# Patient Record
Sex: FEMALE | Race: WHITE | Employment: FULL TIME | URBAN - METROPOLITAN AREA
[De-identification: names, ages, dates, MRNs, and addresses within clinical notes are randomized per-mention and may not be internally consistent; named-entity substitution may affect disease eponyms.]

---

## 2018-07-16 ENCOUNTER — OFFICE VISIT (OUTPATIENT)
Dept: URGENT CARE | Facility: URGENT CARE | Age: 18
End: 2018-07-16
Payer: MEDICAID

## 2018-07-16 VITALS
OXYGEN SATURATION: 100 % | WEIGHT: 111 LBS | SYSTOLIC BLOOD PRESSURE: 124 MMHG | DIASTOLIC BLOOD PRESSURE: 76 MMHG | RESPIRATION RATE: 12 BRPM | HEART RATE: 108 BPM | TEMPERATURE: 98.1 F

## 2018-07-16 DIAGNOSIS — R11.0 NAUSEA: Primary | ICD-10-CM

## 2018-07-16 LAB
ALBUMIN UR-MCNC: NEGATIVE MG/DL
APPEARANCE UR: CLEAR
BETA HCG QUAL IFA URINE: NEGATIVE
BILIRUB UR QL STRIP: NEGATIVE
COLOR UR AUTO: YELLOW
GLUCOSE UR STRIP-MCNC: NEGATIVE MG/DL
HGB UR QL STRIP: NEGATIVE
KETONES UR STRIP-MCNC: NEGATIVE MG/DL
LEUKOCYTE ESTERASE UR QL STRIP: NEGATIVE
NITRATE UR QL: NEGATIVE
NON-SQ EPI CELLS #/AREA URNS LPF: NORMAL /LPF
PH UR STRIP: 6.5 PH (ref 5–7)
RBC #/AREA URNS AUTO: NORMAL /HPF
SOURCE: NORMAL
SP GR UR STRIP: 1.01 (ref 1–1.03)
UROBILINOGEN UR STRIP-ACNC: 0.2 EU/DL (ref 0.2–1)
WBC #/AREA URNS AUTO: NORMAL /HPF

## 2018-07-16 PROCEDURE — 84703 CHORIONIC GONADOTROPIN ASSAY: CPT | Performed by: PHYSICIAN ASSISTANT

## 2018-07-16 PROCEDURE — 99214 OFFICE O/P EST MOD 30 MIN: CPT | Performed by: PHYSICIAN ASSISTANT

## 2018-07-16 PROCEDURE — 81001 URINALYSIS AUTO W/SCOPE: CPT | Performed by: PHYSICIAN ASSISTANT

## 2018-07-16 RX ORDER — ONDANSETRON 4 MG/1
4 TABLET, FILM COATED ORAL EVERY 8 HOURS PRN
Qty: 18 TABLET | Refills: 0 | Status: SHIPPED | OUTPATIENT
Start: 2018-07-16 | End: 2018-07-27

## 2018-07-16 NOTE — PROGRESS NOTES
CHIEF COMPLAINT:   Chief Complaint   Patient presents with     Urgent Care     Abdominal Pain     Pt has had soem stomach issues for abut 3 days.  It gets better and worse, off and on.  States that she was struggling to have BM, but then she did have one last night.  Occasional nausea and cramping as well.  No fever.  She has also felt light headed at times ove rthe last 2 days.  She has also been experiencing some anxiety, no specific reason.  Does not take anything for the anxiety.       HPI: Milana Santos is a 17 year old female who presents to clinic today for evaluation of stomach issues for the last 3 days.  Patient notes that she has had stomach aches for about 1 month and have been off and on.  She feels some nausea associated with these although no fever or vomiting.  Endorses diarrhea for the last 3 days.  During this time she has also felt lightheaded and a fast heart rate.  She states that she has been feeling nervous and that she has had difficulty sleeping.  She is unsure what is causing her to feel nervous, but notes that every time she goes to a new place and comes back home she feels nervous.  She has been using oils, and doing yoga to help with her nerves.  Recent weight loss 14 pounds in the last 6 weeks. Both her mother and great grandmother have history of thyroid disease.    No past medical history on file.  No past surgical history on file.  Social History   Substance Use Topics     Smoking status: Never Smoker     Smokeless tobacco: Never Used     Alcohol use Not on file     Current Outpatient Prescriptions   Medication     escitalopram (LEXAPRO) 5 MG tablet     ondansetron (ZOFRAN) 4 MG tablet     No current facility-administered medications for this visit.      No Known Allergies    10 point ROS of systems including Constitutional, Eyes, Respiratory, Cardiovascular, Gastroenterology, Genitourinary, Integumentary, Muscularskeletal, Psychiatric were all negative except for pertinent  positives noted in my HPI.        Exam:  /76  Pulse 108  Temp 98.1  F (36.7  C) (Oral)  Resp 12  Wt 111 lb (50.3 kg)  SpO2 100%  Constitutional: healthy, alert and no distress  Head: Normocephalic, atraumatic.  Eyes: conjunctiva clear, no drainage  ENT: TMs clear and shiny hermes, nasal mucosa pink and moist, throat without tonsillar hypertrophy or erythema  Neck: neck is supple, no cervical lymphadenopathy or nuchal rigidity  Cardiovascular: RRR  Respiratory: CTA bilaterally, no rhonchi or rales  Gastrointestinal: soft and nontender  Skin: no rashes  Neurologic: Speech clear, gait normal. Moves all extremities.    Results for orders placed or performed in visit on 07/16/18   UA with Microscopic reflex to Culture   Result Value Ref Range    Color Urine Yellow     Appearance Urine Clear     Glucose Urine Negative NEG^Negative mg/dL    Bilirubin Urine Negative NEG^Negative    Ketones Urine Negative NEG^Negative mg/dL    Specific Gravity Urine 1.010 1.003 - 1.035    pH Urine 6.5 5.0 - 7.0 pH    Protein Albumin Urine Negative NEG^Negative mg/dL    Urobilinogen Urine 0.2 0.2 - 1.0 EU/dL    Nitrite Urine Negative NEG^Negative    Blood Urine Negative NEG^Negative    Leukocyte Esterase Urine Negative NEG^Negative    Source Midstream Urine     WBC Urine 0 - 5 OTO5^0 - 5 /HPF    RBC Urine O - 2 OTO2^O - 2 /HPF    Squamous Epithelial /LPF Urine Few FEW^Few /LPF   Beta HCG qual IFA urine   Result Value Ref Range    Beta HCG Qual IFA Urine Negative NEG^Negative            ASSESSMENT/PLAN:  1. Nausea  17 year old female presents with a one-month history of nausea, anxiety, diarrhea and weight loss.  Labs done today are reassuring as well as the labs done at her primary care office.  Long discussion had with patient about stress reduction, belly breathing and anxiety reducing techniques.  Patient is not interested in beginning an SSRI today, secondary to side effects.  Appointment is made with a PCP for the end of this  week to discuss symptoms and so that she can think about anxiolytic medication more and discussed it with her mother.  Patient is nonsuicidal and safe to discharge home.  - UA with Microscopic reflex to Culture  - Beta HCG qual IFA urine  - ondansetron (ZOFRAN) 4 MG tablet; Take 1 tablet (4 mg) by mouth every 8 hours as needed for nausea  Dispense: 18 tablet; Refill: 0    25 minutes was spent with the patient and greater than 50% was spent counseling.  Sanam Duff PA-C

## 2018-07-16 NOTE — MR AVS SNAPSHOT
After Visit Summary   7/16/2018    Milana Santos    MRN: [id number]           Patient Information     Date Of Birth          2000        Visit Information        Provider Department      7/16/2018 3:10 PM Sanam Duff PA-C [facility]        Today's Diagnoses     Nausea    -  1      Care Instructions    It seems that your symptoms are all related to your anxiety. I sent over a prescription for Zofran which should help with nausea. In the meantime, small meals frequently. Would like you to speak with Dr. Max, if you are still feeling nauseas or like you cannot eat. Urine results are normal here.           Follow-ups after your visit        Your next 10 appointments already scheduled     Jul 20, 2018  1:50 PM CDT   [facility]    [address] [place] [address]  [address]  [facility] [id number] [phone]           Bring a current list of meds and any records pertaining to this visit. For Physicals, please bring immunization records and any forms needing to be filled out. Please arrive 10 minutes early to complete paperwork.              Who to contact     If you have questions or need follow up information about today's clinic visit or your schedule please contact [facility] directly at [phone].  Normal or non-critical lab and imaging results will be communicated to you by MyChart, letter or phone within 4 business days after the clinic has received the results. If you do not hear from us within 7 days, please contact the clinic through MyChart or phone. If you have a critical or abnormal lab result, we will notify you by phone as soon as possible.  Submit refill requests through [employer] or call your pharmacy and they will forward the refill request to us. Please allow 3 business days for your refill to be completed.          Additional Information About Your Visit         Care EveryWhere ID     This is your Care EveryWhere ID. This could be used by other organizations to access your [facility] medical records  [id number]        Your Vitals Were     Pulse Temperature Respirations Pulse Oximetry          108 98.1  F (36.7  C) (Oral) 12 100%         Blood Pressure from Last 3 Encounters:   07/16/18 124/76   04/15/15 124/83    Weight from Last 3 Encounters:   07/16/18 111 lb (50.3 kg) (17 %)*   04/15/15 112 lb 7 oz (51 kg) (32 %)*   12/29/13 106 lb 4 oz (48.2 kg) (29 %)*     * Growth percentiles are based on [facility] 2-20 Years data.              We Performed the Following     Beta HCG qual IFA urine     UA with Microscopic reflex to Culture          Today's Medication Changes          These changes are accurate as of 7/16/18  4:44 PM.  If you have any questions, ask your nurse or doctor.               Start taking these medicines.        Dose/Directions    ondansetron 4 MG tablet   Commonly known as:  ZOFRAN   Used for:  Nausea        Dose:  4 mg   Take 1 tablet (4 mg) by mouth every 8 hours as needed for nausea   Quantity:  18 tablet   Refills:  0            Where to get your medicines      These medications were sent to [facility] [place], [place] - [address]   [address], [place] [address]     Phone:  [phone]     ondansetron 4 MG tablet                Primary Care Provider Office Phone # Fax #    Tamara Vieira, [facility] [phone]       [facility] [address] [place] [address]        Equal Access to Services     [facility]: Hadii aad ku hadasho Soomaali, waaxda luqadaha, qaybta kaalmada adeegyaemmanuel, waxprieto idiin hayaan adeeg kharash la'aan . So [facility] [phone].    ATENCIÓN: Si habla español, tiene a holder disposición servicios gratuitos de asistencia lingüística. Llame al [phone].    We comply with applicable federal civil rights laws and [place] laws. We do not discriminate on the basis of  race, color, national origin, age, disability, sex, sexual orientation, or gender identity.            Thank you!     Thank you for choosing [facility]  for your care. Our goal is always to provide you with excellent care. Hearing back from our patients is one way we can continue to improve our services. Please take a few minutes to complete the written survey that you may receive in the mail after your visit with us. Thank you!             Your Updated Medication List - Protect others around you: Learn how to safely use, store and throw away your medicines at www.disposemymeds.org.          This list is accurate as of 7/16/18  4:44 PM.  Always use your most recent med list.                   Brand Name Dispense Instructions for use Diagnosis    ondansetron 4 MG tablet    ZOFRAN    18 tablet    Take 1 tablet (4 mg) by mouth every 8 hours as needed for nausea    Nausea

## 2018-07-16 NOTE — PATIENT INSTRUCTIONS
It seems that your symptoms are all related to your anxiety. I sent over a prescription for Zofran which should help with nausea. In the meantime, small meals frequently. Would like you to speak with Dr. Max, if you are still feeling nauseas or like you cannot eat. Urine results are normal here.

## 2018-07-16 NOTE — NURSING NOTE
Chief Complaint   Patient presents with     Urgent Care     Abdominal Pain     Pt has had soem stomach issues for abut 3 days.  It gets better and worse, off and on.  States that she was struggling to have BM, but then she did have one last night.  Occasional nausea and cramping as well.  No fever.  She has also felt light headed at times ove rthe last 2 days.  She has also been experiencing some anxiety, no specific reason.  Does not take anything for the anxiety.     Initial /76  Pulse 108  Temp 98.1  F (36.7  C) (Oral)  Resp 12  Wt 111 lb (50.3 kg)  SpO2 100% There is no height or weight on file to calculate BMI..  BP completed using cuff size: regular  Mary Carmen Ayala R.N.

## 2018-07-18 ENCOUNTER — OFFICE VISIT (OUTPATIENT)
Dept: PEDIATRICS | Facility: CLINIC | Age: 18
End: 2018-07-18
Payer: MEDICAID

## 2018-07-18 VITALS
HEIGHT: 63 IN | OXYGEN SATURATION: 99 % | SYSTOLIC BLOOD PRESSURE: 110 MMHG | TEMPERATURE: 98.2 F | BODY MASS INDEX: 19.67 KG/M2 | WEIGHT: 111 LBS | HEART RATE: 67 BPM | DIASTOLIC BLOOD PRESSURE: 60 MMHG

## 2018-07-18 DIAGNOSIS — F41.9 ANXIETY: ICD-10-CM

## 2018-07-18 DIAGNOSIS — R19.7 DIARRHEA, UNSPECIFIED TYPE: Primary | ICD-10-CM

## 2018-07-18 PROBLEM — H91.93 BILATERAL DEAFNESS: Status: ACTIVE | Noted: 2018-07-18

## 2018-07-18 LAB
ALBUMIN SERPL-MCNC: 4.1 G/DL (ref 3.4–5)
ALP SERPL-CCNC: 60 U/L (ref 40–150)
ALT SERPL W P-5'-P-CCNC: 21 U/L (ref 0–50)
ANION GAP SERPL CALCULATED.3IONS-SCNC: 10 MMOL/L (ref 3–14)
AST SERPL W P-5'-P-CCNC: 24 U/L (ref 0–35)
BILIRUB SERPL-MCNC: 0.7 MG/DL (ref 0.2–1.3)
BUN SERPL-MCNC: 5 MG/DL (ref 7–30)
CALCIUM SERPL-MCNC: 9.3 MG/DL (ref 8.5–10.1)
CHLORIDE SERPL-SCNC: 107 MMOL/L (ref 96–110)
CO2 SERPL-SCNC: 23 MMOL/L (ref 20–32)
CREAT SERPL-MCNC: 0.8 MG/DL (ref 0.5–1)
CRP SERPL-MCNC: <2.9 MG/L (ref 0–8)
ERYTHROCYTE [SEDIMENTATION RATE] IN BLOOD BY WESTERGREN METHOD: 5 MM/H (ref 0–20)
GFR SERPL CREATININE-BSD FRML MDRD: >90 ML/MIN/1.7M2
GLUCOSE SERPL-MCNC: 106 MG/DL (ref 70–99)
POTASSIUM SERPL-SCNC: 4.2 MMOL/L (ref 3.4–5.3)
PROT SERPL-MCNC: 7.7 G/DL (ref 6.8–8.8)
SODIUM SERPL-SCNC: 140 MMOL/L (ref 133–144)

## 2018-07-18 PROCEDURE — 99215 OFFICE O/P EST HI 40 MIN: CPT | Performed by: PHYSICIAN ASSISTANT

## 2018-07-18 PROCEDURE — 86140 C-REACTIVE PROTEIN: CPT | Performed by: PHYSICIAN ASSISTANT

## 2018-07-18 PROCEDURE — 80053 COMPREHEN METABOLIC PANEL: CPT | Performed by: PHYSICIAN ASSISTANT

## 2018-07-18 PROCEDURE — 36415 COLL VENOUS BLD VENIPUNCTURE: CPT | Performed by: PHYSICIAN ASSISTANT

## 2018-07-18 PROCEDURE — 85652 RBC SED RATE AUTOMATED: CPT | Performed by: PHYSICIAN ASSISTANT

## 2018-07-18 RX ORDER — ESCITALOPRAM OXALATE 5 MG/1
TABLET ORAL
Qty: 60 TABLET | Refills: 1 | Status: SHIPPED | OUTPATIENT
Start: 2018-07-18 | End: 2018-07-27 | Stop reason: SINTOL

## 2018-07-18 RX ORDER — ESCITALOPRAM OXALATE 5 MG/1
TABLET ORAL
Qty: 60 TABLET | Refills: 0 | Status: SHIPPED | OUTPATIENT
Start: 2018-07-18 | End: 2018-07-18

## 2018-07-18 ASSESSMENT — ANXIETY QUESTIONNAIRES
2. NOT BEING ABLE TO STOP OR CONTROL WORRYING: MORE THAN HALF THE DAYS
5. BEING SO RESTLESS THAT IT IS HARD TO SIT STILL: MORE THAN HALF THE DAYS
6. BECOMING EASILY ANNOYED OR IRRITABLE: MORE THAN HALF THE DAYS
1. FEELING NERVOUS, ANXIOUS, OR ON EDGE: NEARLY EVERY DAY
7. FEELING AFRAID AS IF SOMETHING AWFUL MIGHT HAPPEN: NEARLY EVERY DAY
GAD7 TOTAL SCORE: 16
3. WORRYING TOO MUCH ABOUT DIFFERENT THINGS: MORE THAN HALF THE DAYS
IF YOU CHECKED OFF ANY PROBLEMS ON THIS QUESTIONNAIRE, HOW DIFFICULT HAVE THESE PROBLEMS MADE IT FOR YOU TO DO YOUR WORK, TAKE CARE OF THINGS AT HOME, OR GET ALONG WITH OTHER PEOPLE: VERY DIFFICULT

## 2018-07-18 ASSESSMENT — PATIENT HEALTH QUESTIONNAIRE - PHQ9: 5. POOR APPETITE OR OVEREATING: MORE THAN HALF THE DAYS

## 2018-07-18 NOTE — MR AVS SNAPSHOT
"              After Visit Summary   7/18/2018    Milana Santos    MRN: [id number]           Patient Information     Date Of Birth          2000        Visit Information        Provider Department      7/18/2018 11:30 AM Argenis Marshall PA-C; [facility]        Today's Diagnoses     Diarrhea, unspecified type    -  1    Anxiety          Care Instructions    8/2 THU 11:10 am  Dr. Cornell               Generalized Anxiety Disorder  What is generalized anxiety disorder?   Generalized anxiety disorder (PELON) is a condition in which a person worries excessively and unrealistically. They may also be jittery, restless, or dizzy. When these symptoms last for at least 6 months, a diagnosis of PELON may be made.  PELON may exist by itself, or with both anxiety and depression. It is estimated that almost 5% of people have had this disorder during their lives.  How does it occur?   The cause of PELON is unknown. Genetic and environmental factors play a role. Women have PELON about twice as often as men.  The worry in PELON is not about panic attacks or being afraid in public places. It is typically \"free-floating\" anxiety out of proportion to any real life situation. The worrying can interfere with normal day-to-day activities and work or school.  What are the symptoms?   Symptoms include excessive, unrealistic, and uncontrollable worrying about many things such as:  the state of the world   the economy   violence in society   your job   the bills   chores   family members  Physical symptoms such as muscle tension, sleep problems, or feeling on edge usually go along with anxiety. A person may be short-tempered and unable to focus or concentrate because of the worrying. Other symptoms include sweating, shaking, having a very fast heartbeat, feeling out of breath, needing to go to the bathroom often and feeling like fainting. People with PELON may be uneasy in a group or in a waiting room.  How is it " diagnosed?   There is no lab test for PELON. Your healthcare provider or therapist will ask about your symptoms. He or she will make sure you do not have a medical illness or drug or alcohol problem that could cause the symptoms. Some medicines can cause anxiety or make it worse. These include asthma medicines, stimulants, and steroids such as prednisone.  If you have had the symptoms for at least 6 months, if you have had to cut back on your activities, and if you find it difficult to get things done, you may be diagnosed with generalized anxiety disorder.  How is it treated?   Different types of approaches have proven helpful in treating PELON. These include medicine, behavior therapy, relaxation therapy, cognitive therapy, and stress management techniques. Which treatments your healthcare provider or therapist uses may depend upon how much the disorder interferes with your day-to-day life.  Several types of medicines can help treat PELON. Your healthcare provider will work with you to carefully select the best one for you.  How long will the effects last?   PELON can last many years and sometimes an entire lifetime.   How can I take care of myself?   Get support. Talk with family and friends. Consider joining a support group in your area. Go to a stress management class in your local community.   Learn to manage stress. Ask for help at home and work when the load is too great to handle. Find ways to relax, for example take up a hobby, listen to music, watch movies, take walks. Try deep breathing exercises when you feel stressed.   Take care of your physical health. Try to get at least 7 to 9 hours of sleep each night. Eat a healthy diet. Limit caffeine. If you smoke, quit. Avoid alcohol and drugs, because they can make your symptoms worse. Exercise according to your healthcare provider's instructions.   Check your medicines. To help prevent problems, tell your healthcare provider and pharmacist about all the medicines,  natural remedies, vitamins, and other supplements that you take.   Contact your healthcare provider or therapist if you have any questions or your symptoms seem to be getting worse.  You may also want to contact [facility] (formerly the National Mental Health Association or [facility]). [facility]'s toll-free Information Center number is 6-329-766-[facility]. Its web site address is [email or web address]              Follow-ups after your visit        Your next 10 appointments already scheduled     Jul 20, 2018  1:50 PM CDT   Office Visit with Rocco Max MD   [facility] [place] ([facility])    [address]  [address]  [facility] [id number] [phone]           Bring a current list of meds and any records pertaining to this visit. For Physicals, please bring immunization records and any forms needing to be filled out. Please arrive 10 minutes early to complete paperwork.              Future tests that were ordered for you today     Open Future Orders        Priority Expected Expires Ordered    Cryptosporidium in stool stain Routine  7/18/2019 7/18/2018    Giardia antigen Routine  7/18/2019 7/18/2018    Ova and Parasite Exam Routine Routine  7/18/2019 7/18/2018    Enteric Bacteria and Virus Panel by HILARIA Stool Routine  7/18/2019 7/18/2018            Who to contact     If you have questions or need follow up information about today's clinic visit or your schedule please contact [facility] directly at [phone].  Normal or non-critical lab and imaging results will be communicated to you by MyChart, letter or phone within 4 business days after the clinic has received the results. If you do not hear from us within 7 days, please contact the clinic through MyChart or phone. If you have a critical or abnormal lab result, we will notify you by phone as soon as possible.  Submit refill requests through [employer] or call your pharmacy and they will forward the refill request to  "us. Please allow 3 business days for your refill to be completed.          Additional Information About Your Visit        Care EveryWhere ID     This is your Care EveryWhere ID. This could be used by other organizations to access your [facility] medical records  [id number]        Your Vitals Were     Pulse Temperature Height Pulse Oximetry BMI (Body Mass Index)       67 98.2  F (36.8  C) (Oral) 5' 3\" (1.6 m) 99% 19.66 kg/m2        Blood Pressure from Last 3 Encounters:   07/18/18 110/60   07/16/18 124/76   04/15/15 124/83    Weight from Last 3 Encounters:   07/18/18 111 lb (50.3 kg) (17 %)*   07/16/18 111 lb (50.3 kg) (17 %)*   04/15/15 112 lb 7 oz (51 kg) (32 %)*     * Growth percentiles are based on [facility] 2-20 Years data.              We Performed the Following     Comprehensive metabolic panel     CRP inflammation     Erythrocyte sedimentation rate auto          Today's Medication Changes          These changes are accurate as of 7/18/18 12:26 PM.  If you have any questions, ask your nurse or doctor.               Start taking these medicines.        Dose/Directions    escitalopram 5 MG tablet   Commonly known as:  LEXAPRO   Used for:  Anxiety   Started by:  Argenis Marshall PA-C        Take 1 tab daily x one week, then increase to two tabs daily   Quantity:  60 tablet   Refills:  0            Where to get your medicines      These medications were sent to [id number]  [place], [place] - [address]  [address] [place] [address]     Phone:  [phone]     escitalopram 5 MG tablet                Primary Care Provider Office Phone # Fax #    Tamara Iesha Vieira, [place] [phone]       [facility] [address] [place] [address]        Equal Access to Services     [facility] CHRISTOPHER AH: Hadii larisa crafto Sogina, waaxda luqadaha, qaybta kaalmada adeegyada, anton courtney. So [facility] [phone].    ATENCIÓN: Si derrick espfelix rachel a holder " disposición servicios gratuitos de asistencia lingüística. Aicha camejo [phone].    We comply with applicable federal civil rights laws and [place] laws. We do not discriminate on the basis of race, color, national origin, age, disability, sex, sexual orientation, or gender identity.            Thank you!     Thank you for choosing [facility]  for your care. Our goal is always to provide you with excellent care. Hearing back from our patients is one way we can continue to improve our services. Please take a few minutes to complete the written survey that you may receive in the mail after your visit with us. Thank you!             Your Updated Medication List - Protect others around you: Learn how to safely use, store and throw away your medicines at www.disposemymeds.org.          This list is accurate as of 7/18/18 12:26 PM.  Always use your most recent med list.                   Brand Name Dispense Instructions for use Diagnosis    escitalopram 5 MG tablet    LEXAPRO    60 tablet    Take 1 tab daily x one week, then increase to two tabs daily    Anxiety       ondansetron 4 MG tablet    ZOFRAN    18 tablet    Take 1 tablet (4 mg) by mouth every 8 hours as needed for nausea    Nausea

## 2018-07-18 NOTE — PROGRESS NOTES
"  SUBJECTIVE:   Milana Santos is a 17 year old female, accompanied by mother, who presents to clinic today for the following health issues:  Patient and mother are deaf.    is present for OV.     Abnormal Mood Symptoms  Onset: 4 months and worsening    Description:   Depression: no  Anxiety: YES    Accompanying Signs & Symptoms:  Still participating in activities that you used to enjoy: no  Fatigue: YES  Irritability: no  Difficulty concentrating: YES  Changes in appetite: YES- just this week  Problems with sleep: YES- falling asleep   Mind racing  Heart racing/beating fast : YES- sometimes  Thoughts of hurting yourself or others: none    History:   Recent stress: no  Prior depression hospitalization: None  Family history of depression: YES  Family history of anxiety: YES     Precipitating factors:   Alcohol/drug use: no    Alleviating factors:  Yoga  Therapies Tried and outcome: None  No interest in counseling.     Patient enrolled in school in [place]. Returned from [facility] for vacation.     Patient complains of nausea --most of time. Diarrhea x 3 days and now back to normal. Constipation sometimes. No blood in stools. No vomiting, fevers, chills. Feels subjectively hot and low energy. No heartburn.  RLQ abdominal pain x one week. Intermittent. Symptoms worse with foods. Unsure of types of food.   No history of abdominal surgeries. No history of appendicitis, kidney stones. No history of ovarian cysts. No history of vaginal bleeding. No  symptoms.   Patient currently has menses.  Discussed with prior doctor and labs completed. TSH, Vitamin D both wnl. CBC reveals mild anemia. Ferritin wnl.    ROS:  ROS otherwise negative    OBJECTIVE:                                                    /60 (BP Location: Right arm, Cuff Size: Adult Regular)  Pulse 67  Temp 98.2  F (36.8  C) (Oral)  Ht 5' 3\" (1.6 m)  Wt 111 lb (50.3 kg)  SpO2 99%  BMI 19.66 kg/m2  Body mass index is 19.66 " kg/(m^2).   GENERAL: alert, no distress  HENT: ear canals- normal; TMs- normal; Nose- normal; Mouth- no ulcers, no lesions  NECK: no tenderness, no adenopathy  RESP: lungs clear to auscultation - no rales, no rhonchi, no wheezes  CV: regular rates and rhythm, normal S1 S2, no S3 or S4 and no murmur, no click or rub  ABDOMEN: soft, mid abdominal tenderness, no  hepatosplenomegaly, no masses, normal bowel sounds  BACK: no CVAT  Psych:  mentation appears normal, tearful    Diagnostic test results:  PELON 16  Results for orders placed or performed in visit on 07/18/18 (from the past 24 hour(s))   Erythrocyte sedimentation rate auto   Result Value Ref Range    Sed Rate 5 0 - 20 mm/h        ASSESSMENT/PLAN:                                                    (R19.7) Diarrhea, unspecified type  (primary encounter diagnosis)  Comment: given duration and accompanying symptoms, proceed with stool cultures and labs.   Plan: Erythrocyte sedimentation rate auto, CRP         inflammation, Comprehensive metabolic panel,         Cryptosporidium in stool stain, Giardia         antigen, Ova and Parasite Exam Routine, Enteric        Bacteria and Virus Panel by HILARIA Stool            (F41.9) Anxiety  Comment: discussed in detail today. Reviewed medications, SE, therapeutic levels. Patient reluctant but with increasing anxiety, tearfulness and family history, I do believe patient will benefit from SSRI. Close follow up in two weeks; sooner as needed.   Plan: escitalopram (LEXAPRO) 5 MG tablet,         DISCONTINUED: escitalopram (LEXAPRO) 5 MG         tablet          Time spent in room for the above concerns was 55 minutes.     Argenis Marshall PA-C  [facility]

## 2018-07-18 NOTE — PATIENT INSTRUCTIONS
"8/2 THU 11:10 am  Dr. Cornell               Generalized Anxiety Disorder  What is generalized anxiety disorder?   Generalized anxiety disorder (PELON) is a condition in which a person worries excessively and unrealistically. They may also be jittery, restless, or dizzy. When these symptoms last for at least 6 months, a diagnosis of PELON may be made.  PELON may exist by itself, or with both anxiety and depression. It is estimated that almost 5% of people have had this disorder during their lives.  How does it occur?   The cause of PELON is unknown. Genetic and environmental factors play a role. Women have PELON about twice as often as men.  The worry in PELON is not about panic attacks or being afraid in public places. It is typically \"free-floating\" anxiety out of proportion to any real life situation. The worrying can interfere with normal day-to-day activities and work or school.  What are the symptoms?   Symptoms include excessive, unrealistic, and uncontrollable worrying about many things such as:  the state of the world   the economy   violence in society   your job   the bills   chores   family members  Physical symptoms such as muscle tension, sleep problems, or feeling on edge usually go along with anxiety. A person may be short-tempered and unable to focus or concentrate because of the worrying. Other symptoms include sweating, shaking, having a very fast heartbeat, feeling out of breath, needing to go to the bathroom often and feeling like fainting. People with PELON may be uneasy in a group or in a waiting room.  How is it diagnosed?   There is no lab test for PELON. Your healthcare provider or therapist will ask about your symptoms. He or she will make sure you do not have a medical illness or drug or alcohol problem that could cause the symptoms. Some medicines can cause anxiety or make it worse. These include asthma medicines, stimulants, and steroids such as prednisone.  If you have had the symptoms for at least 6 " months, if you have had to cut back on your activities, and if you find it difficult to get things done, you may be diagnosed with generalized anxiety disorder.  How is it treated?   Different types of approaches have proven helpful in treating PELON. These include medicine, behavior therapy, relaxation therapy, cognitive therapy, and stress management techniques. Which treatments your healthcare provider or therapist uses may depend upon how much the disorder interferes with your day-to-day life.  Several types of medicines can help treat PELON. Your healthcare provider will work with you to carefully select the best one for you.  How long will the effects last?   PELON can last many years and sometimes an entire lifetime.   How can I take care of myself?   Get support. Talk with family and friends. Consider joining a support group in your area. Go to a stress management class in your local community.   Learn to manage stress. Ask for help at home and work when the load is too great to handle. Find ways to relax, for example take up a hobby, listen to music, watch movies, take walks. Try deep breathing exercises when you feel stressed.   Take care of your physical health. Try to get at least 7 to 9 hours of sleep each night. Eat a healthy diet. Limit caffeine. If you smoke, quit. Avoid alcohol and drugs, because they can make your symptoms worse. Exercise according to your healthcare provider's instructions.   Check your medicines. To help prevent problems, tell your healthcare provider and pharmacist about all the medicines, natural remedies, vitamins, and other supplements that you take.   Contact your healthcare provider or therapist if you have any questions or your symptoms seem to be getting worse.  You may also want to contact [employer] (formerly the National Mental [employer] or [employer]). [employer]'s toll-free Information Center number is 7-451-780-[facility]. Its web site address is  http://www.NMHA.org

## 2018-07-19 DIAGNOSIS — R19.7 DIARRHEA, UNSPECIFIED TYPE: ICD-10-CM

## 2018-07-19 PROCEDURE — 87209 SMEAR COMPLEX STAIN: CPT | Performed by: PHYSICIAN ASSISTANT

## 2018-07-19 PROCEDURE — 87206 SMEAR FLUORESCENT/ACID STAI: CPT | Mod: XU | Performed by: PHYSICIAN ASSISTANT

## 2018-07-19 PROCEDURE — 87329 GIARDIA AG IA: CPT | Mod: XU | Performed by: PHYSICIAN ASSISTANT

## 2018-07-19 PROCEDURE — 87506 IADNA-DNA/RNA PROBE TQ 6-11: CPT | Performed by: PHYSICIAN ASSISTANT

## 2018-07-19 PROCEDURE — 87177 OVA AND PARASITES SMEARS: CPT | Performed by: PHYSICIAN ASSISTANT

## 2018-07-19 ASSESSMENT — ANXIETY QUESTIONNAIRES: GAD7 TOTAL SCORE: 16

## 2018-07-20 ENCOUNTER — TELEPHONE (OUTPATIENT)
Dept: PEDIATRICS | Facility: CLINIC | Age: 18
End: 2018-07-20

## 2018-07-20 NOTE — TELEPHONE ENCOUNTER
Pt states that she was started on Lexapro two days ago.  She feels as though the medication is making her body respond in different ways, and feels as though she is experiencing a reaction  to the medication.  Last night she noticed some tingling in her head and became very hot and sweaty.  The pt states that she will no longer be taking this medication.    Informed pt to go to urgent care.  Pt is in agreement with plan.    Donna Shook RN

## 2018-07-22 ENCOUNTER — NURSE TRIAGE (OUTPATIENT)
Dept: NURSING | Facility: CLINIC | Age: 18
End: 2018-07-22

## 2018-07-22 NOTE — TELEPHONE ENCOUNTER
"Hearing impaired assisted call.  Patient states she has little appetite, \"hasn't eaten anything in a week\".  Complains of upset stomach and \"shakiness\".  When FNA asked if shakiness was due to fever, weakness; patient states it is due to anxiety.  FNA recommended next day appointment with PCP.  Patient states she spoke with someone last week and was told to go to , but never went.  Patient states she is taking the Lexapro; denied tingling in head or being very hot/sweaty. Patient will go to  now; provided hours of operation for [place] location.      Reason for Disposition    Symptoms interfere with work or school    Additional Information    Negative: Severe difficulty breathing (e.g., struggling for each breath, speaks in single words)    Negative: Bluish lips, tongue, or face now    Negative: Difficult to awaken or acting confused  (e.g., disoriented, slurred speech)    Negative: Hysterical or combative behavior    Negative: Sounds like a life-threatening emergency to the triager    Negative: Chest pain    Negative: Palpitations, skipped heart beat, or rapid heart beat    Negative: Cough is main symptom    Negative: Suicide thoughts, threats, attempts, or questions    Negative: Depression is main problem or symptom (e.g., feelings of sadness or hopelessness)    Negative: [1] Difficulty breathing AND [2] persists > 10 minutes AND [3] not relieved by reassurance provided by triager    Negative: [1] Lightheadedness or dizziness AND [2] persists > 10 minutes AND [3] not relieved by reassurance provided by triager    Negative: Taking medication containing theophylline (e.g., Theodur)    Negative: [1] Known or suspected alcohol or drug abuse AND [2] feeling very shaky (i.e., visible tremors of hands)    Negative: Patient sounds very sick or weak to the triager    Protocols used: ANXIETY AND PANIC ATTACK-ADULT-      "

## 2018-07-23 LAB
G LAMBLIA AG STL QL IA: NORMAL
O+P STL MICRO: NORMAL
O+P STL MICRO: NORMAL
SPECIMEN SOURCE: NORMAL
SPECIMEN SOURCE: NORMAL

## 2018-07-24 LAB
O+P STL CONC: NORMAL
O+P STL CONC: NORMAL
SPECIMEN SOURCE: NORMAL

## 2018-07-27 ENCOUNTER — TELEPHONE (OUTPATIENT)
Dept: PEDIATRICS | Facility: CLINIC | Age: 18
End: 2018-07-27

## 2018-07-27 ENCOUNTER — OFFICE VISIT (OUTPATIENT)
Dept: PEDIATRICS | Facility: CLINIC | Age: 18
End: 2018-07-27
Payer: MEDICAID

## 2018-07-27 VITALS
BODY MASS INDEX: 19.22 KG/M2 | HEIGHT: 63 IN | DIASTOLIC BLOOD PRESSURE: 60 MMHG | SYSTOLIC BLOOD PRESSURE: 126 MMHG | OXYGEN SATURATION: 98 % | WEIGHT: 108.5 LBS | TEMPERATURE: 98.7 F | HEART RATE: 103 BPM

## 2018-07-27 DIAGNOSIS — F41.9 ANXIETY: ICD-10-CM

## 2018-07-27 DIAGNOSIS — F51.01 PRIMARY INSOMNIA: ICD-10-CM

## 2018-07-27 DIAGNOSIS — K29.00 ACUTE GASTRITIS WITHOUT HEMORRHAGE, UNSPECIFIED GASTRITIS TYPE: Primary | ICD-10-CM

## 2018-07-27 PROCEDURE — 99214 OFFICE O/P EST MOD 30 MIN: CPT | Performed by: INTERNAL MEDICINE

## 2018-07-27 PROCEDURE — T1013 SIGN LANG/ORAL INTERPRETER: HCPCS | Mod: U3 | Performed by: INTERNAL MEDICINE

## 2018-07-27 RX ORDER — HYDROXYZINE HYDROCHLORIDE 25 MG/1
25-50 TABLET, FILM COATED ORAL EVERY 6 HOURS PRN
Qty: 60 TABLET | Refills: 1 | Status: SHIPPED | OUTPATIENT
Start: 2018-07-27 | End: 2018-08-03

## 2018-07-27 NOTE — PROGRESS NOTES
SUBJECTIVE:   Milana Santos is a 17 year old female who presents to clinic today with mom and  for the following health issues:      Medication Followup of Lexapro    Taking Medication as prescribed: yes    Side Effects:  Stomach/back pain, gas bloating acid headache dizziness,heartburn,hard to sleep    Medication Helping Symptoms:  Not sure - not sleeping well, not acting like herself.  Before was high energy and now not any.       Pt would also like to discuss weight, she states she has also notice she has been losing weight not sure if related to medication.     Abd symptoms since 7/14.  Seen in same day care and negative stool studies.  Now eating past week  - today has had acai juice and rice and water - drinks a lot of water and chips.  Has had a little pizza and chicken but mostly not had much.  Just isn't feeling hungry and does feel like gets full early.  Had fever and chills last week - one week ago but none since then.  Has had heartburn.  Has not taken anything for that.  Lat night pain in front of neck that woke her up but was able to get back to sleep.  No emesis but nausea.  BM's normal - daily without blood or melena.  Had some chest pain daily intermittent for past week - mostly in the middle, sometimes on side.  Can feel like a burn or tightness.  Tried zofran but it didn't help.  Also an ache rt flank - not sure what brings it on - can happen with sitting or walking.  Lasts for few mins to 30 mins and is intermittent.  Not tried over the counter medication.        Problem list and histories reviewed & adjusted, as indicated.  Additional history: as documented        Reviewed and updated as needed this visit by clinical staff  Tobacco  Allergies  Meds  Problems  Med Hx  Surg Hx  Fam Hx  Soc Hx        Reviewed and updated as needed this visit by Provider  Allergies  Meds  Problems          ROS:  Constitutional, HEENT, cardiovascular, pulmonary, gi and gu systems are  "negative, except as otherwise noted.    OBJECTIVE:     /60 (BP Location: Right arm, Patient Position: Chair, Cuff Size: Adult Regular)  Pulse 103  Temp 98.7  F (37.1  C) (Tympanic)  Ht 5' 3\" (1.6 m)  Wt 108 lb 8 oz (49.2 kg)  SpO2 98%  BMI 19.22 kg/m2  Body mass index is 19.22 kg/(m^2).  GENERAL: healthy, alert and no distress  EYES: Eyes grossly normal to inspection, PERRL and conjunctivae and sclerae normal  HENT: ear canals and TM's normal, nose and mouth without ulcers or lesions  NECK: no adenopathy, no asymmetry, masses, or scars and thyroid normal to palpation  RESP: lungs clear to auscultation - no rales, rhonchi or wheezes  CV: regular rate and rhythm, normal S1 S2, no S3 or S4, no murmur, click or rub, no peripheral edema and peripheral pulses strong  ABDOMEN: soft, nontender, no hepatosplenomegaly, no masses and bowel sounds normal  MS: no gross musculoskeletal defects noted, no edema  SKIN: no suspicious lesions or rashes  PSYCH: mentation appears normal, affect normal/bright        ASSESSMENT/PLAN:       1. Acute gastritis without hemorrhage, unspecified gastritis type  Discussed likely infectious trigger and body just needs assistance with healing.  Start PPI and probiotic.  Notify if not better 1-2 weeks.  Lexapro could contribute to nausea, so stop that for now.  - omeprazole (PRILOSEC) 20 MG CR capsule; Take 1 capsule (20 mg) by mouth daily  Dispense: 30 capsule; Refill: 1    2. Primary insomnia  The problem of recurrent insomnia is discussed. Avoidance of caffeine sources is strongly encouraged. Sleep hygiene issues are reviewed. The use of medications including sedative hypnotics, anti-depressants and other medications were reviewed.  Potential for dependence on some of these drugs was discussed.  Rx per orders.   - hydrOXYzine (ATARAX) 25 MG tablet; Take 1-2 tablets (25-50 mg) by mouth every 6 hours as needed for anxiety (insomnia)  Dispense: 60 tablet; Refill: 1    3. " Anxiety  Discussed anxiety, had been mild until recent illness.  Given that serotonin specific reuptake inhibitor could cause nausea, and unclear that she requires one given short-term nature of symptoms, recommended she stop.  If she continues to have anxiety, recommend working with counselor at student health once she returns to school.  Patient thinks this is a good idea.      See Patient Instructions    Deborah Best MD  [facility]

## 2018-07-27 NOTE — TELEPHONE ENCOUNTER
Pt calling through  to notify the following:     - pt was seen by SDP on 7/18 & rx'ed lexapro 5 mg for anxiety  - has been taking 5 mg every day(takes in the morning), didn't increase to 10 mg  - since she has bene on this med, she has been experiencing acid reflux sx's, bloated, nauseated, sweating, abdominal cramping & low appetite off & on  - GI sx's doesn't get better or worse with food/BM  - has vivid dreams some nights  - denies suicidal thoughts, vomiting, trouble breathing, dizziness, HA, palpitations, cardiac sx's or panic attacks  - pt cancelled her appointment with  on 7/20    Scheduled an appointment with  for today at 3:50 to discuss about alternate meds.    Chica RN  Triage Nurse

## 2018-07-27 NOTE — MR AVS SNAPSHOT
After Visit Summary   7/27/2018    Milana Santos    MRN: [id number]           Patient Information     Date Of Birth          2000        Visit Information        Provider Department      7/27/2018 3:45 PM Deborah Best MD; [facility]        Today's Diagnoses     Acute gastritis without hemorrhage, unspecified gastritis type    -  1    Primary insomnia          Care Instructions    Take the omeprazole daily - 30-60 mins before you eat.    I would recommend starting a probiotic with 10-30 billion colony forming units daily to see if that helps.  Our pharmacy carries some of these.     Stop the lexapro for right now to see if it is contributing to your symptoms.    Take hydroxyzine as needed about 30 mins     If you are still feeling anxious despite the above, please follow-up with a counselor at school.    If your stomach is not getting better with the above issues, in 2 weeks, please let me know.          Follow-ups after your visit        Follow-up notes from your care team     Return in about 2 weeks (around 8/10/2018), or if symptoms worsen or fail to improve.      Your next 10 appointments already scheduled     Aug 03, 2018  2:30 PM CDT   Office Visit with Jovi Short MD   [facility] ([facility]    [address]  [address]  [place] [address] [phone]           Bring a current list of meds and any records pertaining to this visit. For Physicals, please bring immunization records and any forms needing to be filled out. Please arrive 10 minutes early to complete paperwork.              Who to contact     If you have questions or need follow up information about today's clinic visit or your schedule please contact [facility] directly at [phone].  Normal or non-critical lab and imaging results will be communicated to you by MyChart, letter or phone within 4 business days after the clinic  "has received the results. If you do not hear from us within 7 days, please contact the clinic through [employer] or phone. If you have a critical or abnormal lab result, we will notify you by phone as soon as possible.  Submit refill requests through [employer] or call your pharmacy and they will forward the refill request to us. Please allow 3 business days for your refill to be completed.          Additional Information About Your Visit        Care EveryWhere ID     This is your Care EveryWhere ID. This could be used by other organizations to access your [facility] medical records  [id number]        Your Vitals Were     Pulse Temperature Height Pulse Oximetry BMI (Body Mass Index)       103 98.7  F (37.1  C) (Tympanic) 5' 3\" (1.6 m) 98% 19.22 kg/m2        Blood Pressure from Last 3 Encounters:   07/27/18 126/60   07/18/18 110/60   07/16/18 124/76    Weight from Last 3 Encounters:   07/27/18 108 lb 8 oz (49.2 kg) (13 %)*   07/18/18 111 lb (50.3 kg) (17 %)*   07/16/18 111 lb (50.3 kg) (17 %)*     * Growth percentiles are based on [facility] 2-20 Years data.              Today, you had the following     No orders found for display         Today's Medication Changes          These changes are accurate as of 7/27/18  4:42 PM.  If you have any questions, ask your nurse or doctor.               Start taking these medicines.        Dose/Directions    hydrOXYzine 25 MG tablet   Commonly known as:  ATARAX   Used for:  Primary insomnia   Started by:  Deborah Best MD        Dose:  25-50 mg   Take 1-2 tablets (25-50 mg) by mouth every 6 hours as needed for anxiety (insomnia)   Quantity:  60 tablet   Refills:  1       omeprazole 20 MG CR capsule   Commonly known as:  priLOSEC   Used for:  Acute gastritis without hemorrhage, unspecified gastritis type   Started by:  Deborah Best MD        Dose:  20 mg   Take 1 capsule (20 mg) by mouth daily   Quantity:  30 capsule   Refills:  1         Stop taking these medicines if you haven't " already. Please contact your care team if you have questions.     ondansetron 4 MG tablet   Commonly known as:  ZOFRAN   Stopped by:  Deborah Best MD                Where to get your medicines      These medications were sent to [facility] [place] - [address]   [address] [place] [address]     Phone:  [phone]     hydrOXYzine 25 MG tablet    omeprazole 20 MG CR capsule                Primary Care Provider Office Phone # Fax #    Tamara Veiira, [facility] [phone]       [facility] [address] [place] [address]        Equal Access to Services     [facility]: Hadii aad ku hadasho Soomaali, waaxda luqadaha, qaybta kaalmada adeegyada, waxay idiin hayaan adeeg khmarycarmen christian . So [facility] [phone].    ATENCIÓN: Si habla español, tiene a holder disposición servicios gratuitos de asistencia lingüística. [facility] [phone].    We comply with applicable federal civil rights laws and [place] laws. We do not discriminate on the basis of race, color, national origin, age, disability, sex, sexual orientation, or gender identity.            Thank you!     Thank you for choosing [facility]  for your care. Our goal is always to provide you with excellent care. Hearing back from our patients is one way we can continue to improve our services. Please take a few minutes to complete the written survey that you may receive in the mail after your visit with us. Thank you!             Your Updated Medication List - Protect others around you: Learn how to safely use, store and throw away your medicines at www.disposemymeds.org.          This list is accurate as of 7/27/18  4:42 PM.  Always use your most recent med list.                   Brand Name Dispense Instructions for use Diagnosis    escitalopram 5 MG tablet    LEXAPRO    60 tablet    One tab daily x one week, then two tabs daily    Anxiety       hydrOXYzine 25 MG tablet     ATARAX    60 tablet    Take 1-2 tablets (25-50 mg) by mouth every 6 hours as needed for anxiety (insomnia)    Primary insomnia       omeprazole 20 MG CR capsule    priLOSEC    30 capsule    Take 1 capsule (20 mg) by mouth daily    Acute gastritis without hemorrhage, unspecified gastritis type

## 2018-07-27 NOTE — PATIENT INSTRUCTIONS
Take the omeprazole daily - 30-60 mins before you eat.    I would recommend starting a probiotic with 10-30 billion colony forming units daily to see if that helps.  Our pharmacy carries some of these.     Stop the lexapro for right now to see if it is contributing to your symptoms.    Take hydroxyzine as needed about 30 mins     If you are still feeling anxious despite the above, please follow-up with a counselor at school.    If your stomach is not getting better with the above issues, in 2 weeks, please let me know.

## 2018-08-03 ENCOUNTER — OFFICE VISIT (OUTPATIENT)
Dept: PEDIATRICS | Facility: CLINIC | Age: 18
End: 2018-08-03
Payer: MEDICAID

## 2018-08-03 VITALS
TEMPERATURE: 98.2 F | HEIGHT: 63 IN | BODY MASS INDEX: 19.31 KG/M2 | WEIGHT: 109 LBS | OXYGEN SATURATION: 98 % | SYSTOLIC BLOOD PRESSURE: 100 MMHG | HEART RATE: 90 BPM | DIASTOLIC BLOOD PRESSURE: 62 MMHG

## 2018-08-03 DIAGNOSIS — F41.9 ANXIETY: Primary | ICD-10-CM

## 2018-08-03 DIAGNOSIS — R10.31 ABDOMINAL PAIN, RIGHT LOWER QUADRANT: ICD-10-CM

## 2018-08-03 DIAGNOSIS — Z30.41 ENCOUNTER FOR SURVEILLANCE OF CONTRACEPTIVE PILLS: ICD-10-CM

## 2018-08-03 DIAGNOSIS — Z11.3 ROUTINE SCREENING FOR STI (SEXUALLY TRANSMITTED INFECTION): ICD-10-CM

## 2018-08-03 PROCEDURE — 87389 HIV-1 AG W/HIV-1&-2 AB AG IA: CPT | Performed by: INTERNAL MEDICINE

## 2018-08-03 PROCEDURE — 87591 N.GONORRHOEAE DNA AMP PROB: CPT | Performed by: INTERNAL MEDICINE

## 2018-08-03 PROCEDURE — 86780 TREPONEMA PALLIDUM: CPT | Performed by: INTERNAL MEDICINE

## 2018-08-03 PROCEDURE — 36415 COLL VENOUS BLD VENIPUNCTURE: CPT | Performed by: INTERNAL MEDICINE

## 2018-08-03 PROCEDURE — 87491 CHLMYD TRACH DNA AMP PROBE: CPT | Performed by: INTERNAL MEDICINE

## 2018-08-03 PROCEDURE — 99214 OFFICE O/P EST MOD 30 MIN: CPT | Performed by: INTERNAL MEDICINE

## 2018-08-03 PROCEDURE — T1013 SIGN LANG/ORAL INTERPRETER: HCPCS | Mod: U3 | Performed by: INTERNAL MEDICINE

## 2018-08-03 RX ORDER — NORGESTIMATE AND ETHINYL ESTRADIOL 7DAYSX3 28
1 KIT ORAL DAILY
Qty: 84 TABLET | Refills: 3 | Status: SHIPPED | OUTPATIENT
Start: 2018-08-03 | End: 2019-08-08

## 2018-08-03 RX ORDER — NORGESTIMATE AND ETHINYL ESTRADIOL 7DAYSX3 28
KIT ORAL
COMMUNITY
Start: 2018-06-06 | End: 2018-08-03

## 2018-08-03 ASSESSMENT — ANXIETY QUESTIONNAIRES
2. NOT BEING ABLE TO STOP OR CONTROL WORRYING: SEVERAL DAYS
3. WORRYING TOO MUCH ABOUT DIFFERENT THINGS: MORE THAN HALF THE DAYS
1. FEELING NERVOUS, ANXIOUS, OR ON EDGE: SEVERAL DAYS
7. FEELING AFRAID AS IF SOMETHING AWFUL MIGHT HAPPEN: MORE THAN HALF THE DAYS
6. BECOMING EASILY ANNOYED OR IRRITABLE: MORE THAN HALF THE DAYS
5. BEING SO RESTLESS THAT IT IS HARD TO SIT STILL: SEVERAL DAYS
GAD7 TOTAL SCORE: 10

## 2018-08-03 ASSESSMENT — PATIENT HEALTH QUESTIONNAIRE - PHQ9: 5. POOR APPETITE OR OVEREATING: SEVERAL DAYS

## 2018-08-03 NOTE — PATIENT INSTRUCTIONS
Nice to meet you today!    I'm glad that things are getting better.     Keep taking the prilosec - my bet is that you'll be able to wean off this medication in the next month or so.     Look up the counseling options at school - even think about setting up an appointment before you leave for [place]. I want you to have a plan in place before you leave.     Keep doing the yoga, deep breathing, mindfulness!    Watch your weight- let us know if it goes down more.   Three meals a day + a snack.     Birth control refilled  Labs today - I'll send you a letter.    We'd be happy to see you back when you're on break!

## 2018-08-03 NOTE — PROGRESS NOTES
SUBJECTIVE:   Milana Santos is a 17 year old female who presents to clinic today for the following health issues:    Patient is here for a follow up on anxiety as well as stomach. Her stomach pain is getting better, not as often. Anxiety is getting better as well since stomach pains are getting under control things are easier. Also learning some new copying skills.     ----------    # Abdominal pain  - Improving since she saw Dr. Best and stopped lexapro.   - Still has intermittent pain, lower left quadrant.   - Normal stools, no blood.   - Eating and drinking okay.   - Has not noticed pattern to abd pain.   - Started about a month after returning from her trip to [facility].   - Thinks she has lost about 10 lbs since the spring - not trying to lose weight.    # Anxiety  - Better now that abd pain is better.   - Still somewhat more anxious than normal.   - Does yoga.   - Deep breathing and mindfulness.  - Not taking atarax.  - Will be returning to [place] for college at the end of the month.     # OCPs  Needs BC refill. Likes the pill - no fh of dvt, no personal history of dvt or migraine with aura.     Problem list and histories reviewed & adjusted, as indicated.  Additional history: as documented    Patient Active Problem List   Diagnosis     GBS (group B streptococcus) infection     Bilateral deafness     No past surgical history on file.    Social History   Substance Use Topics     Smoking status: Never Smoker     Smokeless tobacco: Never Used     Alcohol use Not on file     No family history on file.      Current Outpatient Prescriptions   Medication Sig Dispense Refill     norgestim-eth estrad triphasic (TRI-SPRINTEC) 0.18/0.215/0.25 MG-35 MCG per tablet Take 1 tablet by mouth daily 84 tablet 3     omeprazole (PRILOSEC) 20 MG CR capsule Take 1 capsule (20 mg) by mouth daily 30 capsule 1     No Known Allergies    Reviewed and updated as needed this visit by clinical staff       Reviewed and updated as needed  "this visit by Provider       ROS:  Constitutional, HEENT, cardiovascular, pulmonary, gi and gu systems are negative, except as otherwise noted.    OBJECTIVE:     /62  Pulse 90  Temp 98.2  F (36.8  C) (Oral)  Ht 5' 3\" (1.6 m)  Wt 109 lb (49.4 kg)  SpO2 98%  BMI 19.31 kg/m2  Body mass index is 19.31 kg/(m^2).  GENERAL: healthy, alert and no distress, thin  RESP: lungs clear to auscultation - no rales, rhonchi or wheezes  CV: regular rate and rhythm, normal S1 S2, no S3 or S4, no murmur, click or rub, no peripheral edema and peripheral pulses strong  ABDOMEN: soft, nontender, no hepatosplenomegaly, no masses and bowel sounds normal  MS: no gross musculoskeletal defects noted, no edema  NEURO: Normal strength and tone, mentation intact and speech normal  PSYCH: mentation appears normal, affect normal/bright    Diagnostic Test Results:  Labs reviewed from prior visits.    ASSESSMENT/PLAN:     1. Anxiety  Improving off medications. Discussed importance of mindfulness. Recommended that she research mental health at her school and try to schedule a therapy appointment prior to returning so that she is all hooked in.    2. Abdominal pain, right lower quadrant  Still unclear why she is having pain. Better on prilosec and off lexapro. Now more in rlq - unrelated to cycles. Will ctm for now. We don't have many weight data points but it does appear that she has lost weight. She is not eating 3 meals a day (bc she sleeps in). Discussed healthy eating - 3 meals a day + a snack. Reviewed labs - inflammatory markers negative. Normal sed rate/crp/lytes. If weight loss + abd pain continues would consider w/u for celiac +/- colonoscopy. Would also need thyroid testing + cbc.    3. Encounter for surveillance of contraceptive pills  Discussed at length risk factors of birth control including blood clots and hypertension. Advised to go to ED if ever develops leg swelling or shortness of breath. Side effects including " headaches, nausea, and mood changes were discussed. Reviewed that birth control dose NOT protect again STDs, recommend routine screening with any new partner or symptoms. Discussed that concurrent smoking will further raise risk of blood clots while on contraception.  - norgestim-eth estrad triphasic (TRI-SPRINTEC) 0.18/0.215/0.25 MG-35 MCG per tablet; Take 1 tablet by mouth daily  Dispense: 84 tablet; Refill: 3    4. Routine screening for STI (sexually transmitted infection)  - HIV Antigen Antibody Combo  - Treponema Abs w Reflex to RPR and Titer  - NEISSERIA GONORRHOEA PCR  - CHLAMYDIA TRACHOMATIS PCR    Patient Instructions   Nice to meet you today!    I'm glad that things are getting better.     Keep taking the prilosec - my bet is that you'll be able to wean off this medication in the next month or so.     Look up the counseling options at school - even think about setting up an appointment before you leave for [place]. I want you to have a plan in place before you leave.     Keep doing the yoga, deep breathing, mindfulness!    Watch your weight- let us know if it goes down more.   Three meals a day + a snack.     Birth control refilled  Labs today - I'll send you a letter.    We'd be happy to see you back when you're on break!      Jovi Short MD  [facility]

## 2018-08-03 NOTE — MR AVS SNAPSHOT
After Visit Summary   8/3/2018    Milana Santos    MRN: [id number]           Patient Information     Date Of Birth          2000        Visit Information        Provider Department      8/3/2018 2:30 PM Jovi Short MD; [facility]        Today's Diagnoses     Encounter for surveillance of contraceptive pills    -  1    Routine screening for STI (sexually transmitted infection)          Care Instructions    Nice to meet you today!    I'm glad that things are getting better.     Keep taking the prilosec - my bet is that you'll be able to wean off this medication in the next month or so.     Look up the counseling options at school - even think about setting up an appointment before you leave for [place]. I want you to have a plan in place before you leave.     Keep doing the yoga, deep breathing, mindfulness!    Watch your weight- let us know if it goes down more.   Three meals a day + a snack.     Birth control refilled  Labs today - I'll send you a letter.    We'd be happy to see you back when you're on break!            Follow-ups after your visit        Who to contact     If you have questions or need follow up information about today's clinic visit or your schedule please contact [facility] directly at [phone].  Normal or non-critical lab and imaging results will be communicated to you by MyChart, letter or phone within 4 business days after the clinic has received the results. If you do not hear from us within 7 days, please contact the clinic through Game9zhart or phone. If you have a critical or abnormal lab result, we will notify you by phone as soon as possible.  Submit refill requests through broadbandchoices or call your pharmacy and they will forward the refill request to us. Please allow 3 business days for your refill to be completed.          Additional Information About Your Visit        Care EveryWhere ID     This is your Care EveryWhere ID.  "This could be used by other organizations to access your [facility] medical records  [id number]        Your Vitals Were     Pulse Temperature Height Pulse Oximetry BMI (Body Mass Index)       90 98.2  F (36.8  C) (Oral) 5' 3\" (1.6 m) 98% 19.31 kg/m2        Blood Pressure from Last 3 Encounters:   08/03/18 100/62   07/27/18 126/60   07/18/18 110/60    Weight from Last 3 Encounters:   08/03/18 109 lb (49.4 kg) (13 %)*   07/27/18 108 lb 8 oz (49.2 kg) (13 %)*   07/18/18 111 lb (50.3 kg) (17 %)*     * Growth percentiles are based on CDC 2-20 Years data.              We Performed the Following     CHLAMYDIA TRACHOMATIS PCR     HIV Antigen Antibody Combo     NEISSERIA GONORRHOEA PCR     Treponema Abs w Reflex to RPR and Titer          Today's Medication Changes          These changes are accurate as of 8/3/18  3:23 PM.  If you have any questions, ask your nurse or doctor.               These medicines have changed or have updated prescriptions.        Dose/Directions    norgestim-eth estrad triphasic 0.18/0.215/0.25 MG-35 MCG per tablet   Commonly known as:  TRI-SPRINTEC   This may have changed:  See the new instructions.   Used for:  Encounter for surveillance of contraceptive pills   Changed by:  Jovi Short MD        Dose:  1 tablet   Take 1 tablet by mouth daily   Quantity:  84 tablet   Refills:  3            Where to get your medicines      These medications were sent to [facility] [place] - [address]   [address]  [address], [place] [address]     Phone:  [phone]     norgestim-eth estrad triphasic 0.18/0.215/0.25 MG-35 MCG per tablet                Primary Care Provider Office Phone # Fax #    Tamara Vieira [phone]       [facility] [address] [place] [address]        Equal Access to Services     [facility] AH: Hadii aad kalen eWst, waaxda luqadaha, qaybta kaalmaanton ceron" bowen harrysteveelaine marieeChaitanyaaakaren ah. So [facility] [phone].    ATENCIÓN: Si habla janneth, tiene a holder disposición servicios gratuitos de asistencia lingüística. Aicha al [phone].    We comply with applicable federal civil rights laws and [place] laws. We do not discriminate on the basis of race, color, national origin, age, disability, sex, sexual orientation, or gender identity.            Thank you!     Thank you for choosing [facility]  for your care. Our goal is always to provide you with excellent care. Hearing back from our patients is one way we can continue to improve our services. Please take a few minutes to complete the written survey that you may receive in the mail after your visit with us. Thank you!             Your Updated Medication List - Protect others around you: Learn how to safely use, store and throw away your medicines at www.disposemymeds.org.          This list is accurate as of 8/3/18  3:23 PM.  Always use your most recent med list.                   Brand Name Dispense Instructions for use Diagnosis    norgestim-eth estrad triphasic 0.18/0.215/0.25 MG-35 MCG per tablet    TRI-SPRINTEC    84 tablet    Take 1 tablet by mouth daily    Encounter for surveillance of contraceptive pills       omeprazole 20 MG CR capsule    priLOSEC    30 capsule    Take 1 capsule (20 mg) by mouth daily    Acute gastritis without hemorrhage, unspecified gastritis type

## 2018-08-04 ASSESSMENT — ANXIETY QUESTIONNAIRES: GAD7 TOTAL SCORE: 10

## 2018-08-05 LAB
C TRACH DNA SPEC QL NAA+PROBE: NEGATIVE
N GONORRHOEA DNA SPEC QL NAA+PROBE: NEGATIVE
SPECIMEN SOURCE: NORMAL
SPECIMEN SOURCE: NORMAL
T PALLIDUM AB SER QL: NONREACTIVE

## 2018-08-06 PROBLEM — F41.9 ANXIETY: Status: ACTIVE | Noted: 2018-07-02

## 2018-08-06 LAB — HIV 1+2 AB+HIV1 P24 AG SERPL QL IA: NONREACTIVE

## 2018-08-20 ENCOUNTER — OFFICE VISIT (OUTPATIENT)
Dept: PEDIATRICS | Facility: CLINIC | Age: 18
End: 2018-08-20
Payer: MEDICAID

## 2018-08-20 VITALS
DIASTOLIC BLOOD PRESSURE: 56 MMHG | SYSTOLIC BLOOD PRESSURE: 108 MMHG | HEIGHT: 63 IN | WEIGHT: 111.7 LBS | HEART RATE: 77 BPM | OXYGEN SATURATION: 100 % | TEMPERATURE: 97.7 F | BODY MASS INDEX: 19.79 KG/M2

## 2018-08-20 DIAGNOSIS — R10.84 ABDOMINAL PAIN, GENERALIZED: ICD-10-CM

## 2018-08-20 DIAGNOSIS — R10.84 ABDOMINAL PAIN, GENERALIZED: Primary | ICD-10-CM

## 2018-08-20 DIAGNOSIS — R53.83 FATIGUE, UNSPECIFIED TYPE: ICD-10-CM

## 2018-08-20 LAB
CRP SERPL-MCNC: <2.9 MG/L (ref 0–8)
ERYTHROCYTE [DISTWIDTH] IN BLOOD BY AUTOMATED COUNT: 13 % (ref 10–15)
ERYTHROCYTE [SEDIMENTATION RATE] IN BLOOD BY WESTERGREN METHOD: 7 MM/H (ref 0–20)
HCT VFR BLD AUTO: 39.4 % (ref 35–47)
HETEROPH AB SER QL: NEGATIVE
HGB BLD-MCNC: 12.6 G/DL (ref 11.7–15.7)
MCH RBC QN AUTO: 27.8 PG (ref 26.5–33)
MCHC RBC AUTO-ENTMCNC: 32 G/DL (ref 31.5–36.5)
MCV RBC AUTO: 87 FL (ref 78–100)
PLATELET # BLD AUTO: 312 10E9/L (ref 150–450)
RBC # BLD AUTO: 4.53 10E12/L (ref 3.8–5.2)
WBC # BLD AUTO: 7.2 10E9/L (ref 4–11)

## 2018-08-20 PROCEDURE — 84443 ASSAY THYROID STIM HORMONE: CPT | Performed by: INTERNAL MEDICINE

## 2018-08-20 PROCEDURE — 85652 RBC SED RATE AUTOMATED: CPT | Performed by: INTERNAL MEDICINE

## 2018-08-20 PROCEDURE — T1013 SIGN LANG/ORAL INTERPRETER: HCPCS | Mod: U3 | Performed by: INTERNAL MEDICINE

## 2018-08-20 PROCEDURE — 86140 C-REACTIVE PROTEIN: CPT | Performed by: INTERNAL MEDICINE

## 2018-08-20 PROCEDURE — 99214 OFFICE O/P EST MOD 30 MIN: CPT | Performed by: INTERNAL MEDICINE

## 2018-08-20 PROCEDURE — 86308 HETEROPHILE ANTIBODY SCREEN: CPT | Performed by: INTERNAL MEDICINE

## 2018-08-20 PROCEDURE — 86618 LYME DISEASE ANTIBODY: CPT | Performed by: INTERNAL MEDICINE

## 2018-08-20 PROCEDURE — 87338 HPYLORI STOOL AG IA: CPT | Performed by: INTERNAL MEDICINE

## 2018-08-20 PROCEDURE — 80053 COMPREHEN METABOLIC PANEL: CPT | Performed by: INTERNAL MEDICINE

## 2018-08-20 PROCEDURE — 82784 ASSAY IGA/IGD/IGG/IGM EACH: CPT | Performed by: INTERNAL MEDICINE

## 2018-08-20 PROCEDURE — 83516 IMMUNOASSAY NONANTIBODY: CPT | Performed by: INTERNAL MEDICINE

## 2018-08-20 PROCEDURE — 85027 COMPLETE CBC AUTOMATED: CPT | Performed by: INTERNAL MEDICINE

## 2018-08-20 PROCEDURE — 36415 COLL VENOUS BLD VENIPUNCTURE: CPT | Performed by: INTERNAL MEDICINE

## 2018-08-20 NOTE — PATIENT INSTRUCTIONS
I'm so sorry that things have gotten worse!    Labs today - checking liver, kidneys, blood counts, inflammatory markers, screen for celiac disease, lyme, thyroid, etc.     Referral to GI (usually easier to get into [place] [facility] but the folks at the  are great too). You have to call them to schedule.   -- see if they can get you in this week (may have to be flexible on location)  -- if not see who your insurance covers in [place].    I'll be in touch with the results as soon as they are back.     I do think not feeling well can be hard on your mind, and certainly being anxious can be hard on your body too - look into services at school for therapy - this may be helpful as we work to figure stuff out.

## 2018-08-20 NOTE — PROGRESS NOTES
SUBJECTIVE:   Milana Santos is a 18 year old female who presents to clinic today for the following health issues:    With stomach issues, things have stayed the same but in the last week weeks have gotten worse, waking up and going to sleep with stomach pains and having sensitively to foods when eating.    -----------    Notes more fatigue - not keeping her from doing activities.   Sore muscles over the past few days - all over her body but right neck was sore yesterday.   No vision changes.   Some intermittent nausea  Daily generalized abd pain, nausea.   Thinks worse with meals.   Eats a usual diet.   Still having some heartburn. Prilosec does help - thinks this is different than her abd pain.    Stools every 1-2 days. Most normal, some diarrhea which is new for her. No blood. No change in pain with stools.    Went camping earlier this summer.   No fevers.   No rash. Specifically no rash that looked like a targe.   No tick bites that she is aware of.    To  [place] earlier this summer.     Mom has hypothyroid.    Weight stable    Flying back to [place] on Thursday.   Wondering if there is a scan that should be done.     Anxious about going back to school and anxious about stomach issues.    Problem list and histories reviewed & adjusted, as indicated.  Additional history: as documented    Patient Active Problem List   Diagnosis     GBS (group B streptococcus) infection     Bilateral deafness     Anxiety     No past surgical history on file.    Social History   Substance Use Topics     Smoking status: Never Smoker     Smokeless tobacco: Never Used     Alcohol use Not on file     Family History   Problem Relation Age of Onset     Thyroid Disease Mother          Current Outpatient Prescriptions   Medication Sig Dispense Refill     norgestim-eth estrad triphasic (TRI-SPRINTEC) 0.18/0.215/0.25 MG-35 MCG per tablet Take 1 tablet by mouth daily 84 tablet 3     omeprazole (PRILOSEC) 20 MG CR capsule Take 1 capsule (20 mg)  "by mouth daily 30 capsule 1     Probiotic Product (PROBIOTIC DAILY PO)        No Known Allergies    Reviewed and updated as needed this visit by clinical staff       Reviewed and updated as needed this visit by Provider         ROS:  Constitutional, HEENT, cardiovascular, pulmonary, gi and gu systems are negative, except as otherwise noted.    OBJECTIVE:     /56  Pulse 77  Temp 97.7  F (36.5  C) (Oral)  Ht 5' 3\" (1.6 m)  Wt 111 lb 11.2 oz (50.7 kg)  SpO2 100%  Breastfeeding? No  BMI 19.79 kg/m2  Body mass index is 19.79 kg/(m^2).     Wt Readings from Last 4 Encounters:   08/20/18 111 lb 11.2 oz (50.7 kg)   08/03/18 109 lb (49.4 kg) (13 %)*   07/27/18 108 lb 8 oz (49.2 kg) (13 %)*   07/18/18 111 lb (50.3 kg) (17 %)*     * Growth percentiles are based on CDC 2-20 Years data.     GENERAL: healthy, alert and no distress  EYES: Eyes grossly normal to inspection, PERRL and conjunctivae and sclerae normal  HENT: ear canals and TM's normal, nose and mouth without ulcers or lesions  NECK: no adenopathy, no asymmetry, masses, or scars and thyroid normal to palpation  RESP: lungs clear to auscultation - no rales, rhonchi or wheezes  CV: regular rate and rhythm, normal S1 S2, no S3 or S4, no murmur, click or rub, no peripheral edema and peripheral pulses strong  ABDOMEN: soft, very mildly tender throughout, no hepatosplenomegaly, no masses and bowel sounds normal  MS: no gross musculoskeletal defects noted, no edema  SKIN: no suspicious lesions or rashes  NEURO: Normal strength and tone, mentation intact and speech normal  PSYCH: mentation appears normal, affect normal/bright    Diagnostic Test Results:  Labs pending.    ASSESSMENT/PLAN:       ICD-10-CM    1. Abdominal pain, generalized R10.84 Comprehensive metabolic panel     CBC with platelets     Erythrocyte sedimentation rate auto     Tissue transglutaminase antibody IgA     IgA     GASTROENTEROLOGY ADULT REF CONSULT ONLY     TSH with free T4 reflex     " Mononucleosis screen     Lyme Disease Kailyn with reflex to WB Serum     H Pylori antigen, stool     CRP inflammation   2. Fatigue, unspecified type R53.83      Unclear why her abd pain has persisted. There does seem to be an anxiety component as well but I'm not sure if the abd pain is making the anxiety worse or vice versa (likely both).     She does have FH of autoimmune disease therefore will expand w/u to check thyroid, celiac, and recheck inflammatory makers.     IBS is possible given history of anxiety but need to r/o other causes first.     It doesn't quite sound like an ulcer but given travel to [place] will check for h pylori.    Less likely to be lyme but given body aches, fatigue will check as well.     We discussed a GI eval - she's going back to school in [place] on Thursday so it may make more sense to have GI eval done there. Also talked to Milana and Mom about seeing an internist there if her symptoms persist. I signed her up for [employer] so she can print off her lab results for any other doctors she sees in [place].    We also briefly reviewed the bio-psycho-social model of mental health - she's going to look into what counseling options her school offers as it may be helpful as we work through her GI issues.    Patient Instructions   I'm so sorry that things have gotten worse!    Labs today - checking liver, kidneys, blood counts, inflammatory markers, screen for celiac disease, lyme, thyroid, etc.     Referral to GI (usually easier to get into [place] [facility] but the folks at the  are great too). You have to call them to schedule.   -- see if they can get you in this week (may have to be flexible on location)  -- if not see who your insurance covers in [place].    I'll be in touch with the results as soon as they are back.     I do think not feeling well can be hard on your mind, and certainly being anxious can be hard on your body too - look into services at school for therapy - this may be helpful as we work to figure  stuff out.     Jovi Short MD  [facility]

## 2018-08-20 NOTE — MR AVS SNAPSHOT
After Visit Summary   8/20/2018    Milana Santos    MRN: [id number]           Patient Information     Date Of Birth          2000        Visit Information        Provider Department      8/20/2018 1:10 PM Jovi Short MD; ASL IS [facility]        Today's Diagnoses     Abdominal pain, generalized    -  1      Care Instructions    I'm so sorry that things have gotten worse!    Labs today - checking liver, kidneys, blood counts, inflammatory markers, screen for celiac disease, lyme, thyroid, etc.     Referral to GI (usually easier to get into [place] [facility] but the folks at the  are great too). You have to call them to schedule.   -- see if they can get you in this week (may have to be flexible on location)  -- if not see who your insurance covers in [place].    I'll be in touch with the results as soon as they are back.     I do think not feeling well can be hard on your mind, and certainly being anxious can be hard on your body too - look into services at school for therapy - this may be helpful as we work to figure stuff out.             Follow-ups after your visit        Additional Services     GASTROENTEROLOGY ADULT REF CONSULT ONLY       Preferred Location: [facility], [facility] [phone] and [facility] [phone]      Please be aware that coverage of these services is subject to the terms and limitations of your health insurance plan.  Call member services at your health plan with any benefit or coverage questions.  Any procedures must be performed at a [facility] facility OR coordinated by your clinic's referral office.    Please bring the following with you to your appointment:    (1) Any X-Rays, CTs or MRIs which have been performed.  Contact the facility where they were done to arrange for  prior to your scheduled appointment.    (2) List of current medications   (3) This referral request   (4) Any documents/labs given to you for this referral                 "  Follow-up notes from your care team     Return in about 3 months (around 11/20/2018) for Follow Up.      Future tests that were ordered for you today     Open Future Orders        Priority Expected Expires Ordered    H Pylori antigen, stool Routine  9/19/2018 8/20/2018            Who to contact     If you have questions or need follow up information about today's clinic visit or your schedule please contact [facility] directly at [phone].  Normal or non-critical lab and imaging results will be communicated to you by MyChart, letter or phone within 4 business days after the clinic has received the results. If you do not hear from us within 7 days, please contact the clinic through Newdeahart or phone. If you have a critical or abnormal lab result, we will notify you by phone as soon as possible.  Submit refill requests through GridNetworks or call your pharmacy and they will forward the refill request to us. Please allow 3 business days for your refill to be completed.          Additional Information About Your Visit        Care EveryWhere ID     This is your Care EveryWhere ID. This could be used by other organizations to access your [facility] medical records  [id number]        Your Vitals Were     Pulse Temperature Height Pulse Oximetry Breastfeeding? BMI (Body Mass Index)    77 97.7  F (36.5  C) (Oral) 5' 3\" (1.6 m) 100% No 19.79 kg/m2       Blood Pressure from Last 3 Encounters:   08/20/18 108/56   08/03/18 100/62   07/27/18 126/60    Weight from Last 3 Encounters:   08/20/18 111 lb 11.2 oz (50.7 kg)   08/03/18 109 lb (49.4 kg) (13 %)*   07/27/18 108 lb 8 oz (49.2 kg) (13 %)*     * Growth percentiles are based on CDC 2-20 Years data.              We Performed the Following     CBC with platelets     Comprehensive metabolic panel     CRP inflammation     Erythrocyte sedimentation rate auto     GASTROENTEROLOGY ADULT REF CONSULT ONLY     IgA     Lyme Disease Kailyn with reflex to WB Serum     Mononucleosis " screen     Tissue transglutaminase antibody IgA     TSH with free T4 reflex        Primary Care Provider Office Phone # Fax #    Tamara Vieira, ALLYN [phone]       [facility] [address] [place] [address]        Equal Access to Services     [facility] CHRISTOPHER AH: Hadii aad ku hadasho Soomaali, waaxda luqadaha, qaybta kaalmada adeegyada, waxay idiin hayaan adeeg khmarycarmen la'aan ah. So [facility] [phone].    ATENCIÓN: Si habla español, tiene a holder disposición servicios gratuitos de asistencia lingüística. Llame al [phone].    We comply with applicable federal civil rights laws and [place] laws. We do not discriminate on the basis of race, color, national origin, age, disability, sex, sexual orientation, or gender identity.            Thank you!     Thank you for choosing [facility]  for your care. Our goal is always to provide you with excellent care. Hearing back from our patients is one way we can continue to improve our services. Please take a few minutes to complete the written survey that you may receive in the mail after your visit with us. Thank you!             Your Updated Medication List - Protect others around you: Learn how to safely use, store and throw away your medicines at www.disposemymeds.org.          This list is accurate as of 8/20/18  1:40 PM.  Always use your most recent med list.                   Brand Name Dispense Instructions for use Diagnosis    norgestim-eth estrad triphasic 0.18/0.215/0.25 MG-35 MCG per tablet    TRI-SPRINTEC    84 tablet    Take 1 tablet by mouth daily    Encounter for surveillance of contraceptive pills       omeprazole 20 MG CR capsule    priLOSEC    30 capsule    Take 1 capsule (20 mg) by mouth daily    Acute gastritis without hemorrhage, unspecified gastritis type       PROBIOTIC DAILY PO

## 2018-08-21 LAB
ALBUMIN SERPL-MCNC: 4.3 G/DL (ref 3.4–5)
ALP SERPL-CCNC: 81 U/L (ref 40–150)
ALT SERPL W P-5'-P-CCNC: 19 U/L (ref 0–50)
ANION GAP SERPL CALCULATED.3IONS-SCNC: 6 MMOL/L (ref 3–14)
AST SERPL W P-5'-P-CCNC: 17 U/L (ref 0–45)
B BURGDOR IGG+IGM SER QL: 0.03 (ref 0–0.89)
BILIRUB SERPL-MCNC: 0.5 MG/DL (ref 0.2–1.3)
BUN SERPL-MCNC: 10 MG/DL (ref 7–30)
CALCIUM SERPL-MCNC: 8.8 MG/DL (ref 8.5–10.1)
CHLORIDE SERPL-SCNC: 106 MMOL/L (ref 94–109)
CO2 SERPL-SCNC: 29 MMOL/L (ref 20–32)
CREAT SERPL-MCNC: 0.68 MG/DL (ref 0.52–1.04)
GFR SERPL CREATININE-BSD FRML MDRD: >90 ML/MIN/1.7M2
GLUCOSE SERPL-MCNC: 72 MG/DL (ref 70–99)
IGA SERPL-MCNC: 122 MG/DL (ref 70–380)
POTASSIUM SERPL-SCNC: 3.7 MMOL/L (ref 3.4–5.3)
PROT SERPL-MCNC: 7.8 G/DL (ref 6.8–8.8)
SODIUM SERPL-SCNC: 141 MMOL/L (ref 133–144)
TSH SERPL DL<=0.005 MIU/L-ACNC: 1.01 MU/L (ref 0.4–4)
TTG IGA SER-ACNC: <1 U/ML

## 2018-08-22 LAB
H PYLORI AG STL QL IA: NORMAL
SPECIMEN SOURCE: NORMAL

## 2018-11-21 ENCOUNTER — OFFICE VISIT (OUTPATIENT)
Dept: PEDIATRICS | Facility: CLINIC | Age: 18
End: 2018-11-21
Payer: MEDICAID

## 2018-11-21 VITALS
TEMPERATURE: 98.5 F | BODY MASS INDEX: 20.38 KG/M2 | DIASTOLIC BLOOD PRESSURE: 70 MMHG | OXYGEN SATURATION: 98 % | HEART RATE: 95 BPM | WEIGHT: 115 LBS | HEIGHT: 63 IN | SYSTOLIC BLOOD PRESSURE: 118 MMHG

## 2018-11-21 DIAGNOSIS — N92.1 MENORRHAGIA WITH IRREGULAR CYCLE: Primary | ICD-10-CM

## 2018-11-21 LAB
ERYTHROCYTE [DISTWIDTH] IN BLOOD BY AUTOMATED COUNT: 12.5 % (ref 10–15)
FERRITIN SERPL-MCNC: 11 NG/ML (ref 12–150)
HCT VFR BLD AUTO: 36.9 % (ref 35–47)
HGB BLD-MCNC: 11.9 G/DL (ref 11.7–15.7)
MCH RBC QN AUTO: 27.8 PG (ref 26.5–33)
MCHC RBC AUTO-ENTMCNC: 32.2 G/DL (ref 31.5–36.5)
MCV RBC AUTO: 86 FL (ref 78–100)
PLATELET # BLD AUTO: 290 10E9/L (ref 150–450)
RBC # BLD AUTO: 4.28 10E12/L (ref 3.8–5.2)
TSH SERPL DL<=0.005 MIU/L-ACNC: 2.64 MU/L (ref 0.4–4)
WBC # BLD AUTO: 7.9 10E9/L (ref 4–11)

## 2018-11-21 PROCEDURE — 84443 ASSAY THYROID STIM HORMONE: CPT | Performed by: INTERNAL MEDICINE

## 2018-11-21 PROCEDURE — 82728 ASSAY OF FERRITIN: CPT | Performed by: INTERNAL MEDICINE

## 2018-11-21 PROCEDURE — 36415 COLL VENOUS BLD VENIPUNCTURE: CPT | Performed by: INTERNAL MEDICINE

## 2018-11-21 PROCEDURE — 85027 COMPLETE CBC AUTOMATED: CPT | Performed by: INTERNAL MEDICINE

## 2018-11-21 PROCEDURE — 99214 OFFICE O/P EST MOD 30 MIN: CPT | Performed by: INTERNAL MEDICINE

## 2018-11-21 RX ORDER — DROSPIRENONE AND ETHINYL ESTRADIOL 0.03MG-3MG
1 KIT ORAL DAILY
Qty: 84 TABLET | Refills: 3 | Status: SHIPPED | OUTPATIENT
Start: 2018-11-21 | End: 2019-10-30

## 2018-11-21 NOTE — PROGRESS NOTES
SUBJECTIVE:   Milana Santos is a 18 year old female who presents to clinic today for the following health issues:      Recheck Medication     Patient presents for return visit on OCPs.  She notes that since August or earlier in the summer she has noted heavy and slightly irregular periods.  They have been lasting anywhere from 7 days or longer, being fairly heavy, noting to have to change pads or tampons every 2 hours.  She is not having breakthrough bleeding at night.  She does note that she feels fatigued and tired as well.  She had STI testing this summer.  She has been compliant with her medications notes that she is currently on a different brand name of her OCP had been on Tri-Sprintec before and currently appears to be on same dose of medication just different brand.  Notes that previously the pill had worked quite well and controlled cycles but of late has started to have the changes as noted above.    She will have some slight nausea in the morning has some lower cramping abdominal pain with cycles no current abdominal pain no dysuria no vaginal irritation or discharge.    No DVT history, No migraine with aura history.    Problem list and histories reviewed & adjusted, as indicated.  Additional history: as documented    Patient Active Problem List   Diagnosis     GBS (group B streptococcus) infection     Bilateral deafness     Anxiety     History reviewed. No pertinent surgical history.    Social History   Substance Use Topics     Smoking status: Never Smoker     Smokeless tobacco: Never Used     Alcohol use Not on file     Family History   Problem Relation Age of Onset     Thyroid Disease Mother            Reviewed and updated as needed this visit by clinical staff  Tobacco  Allergies  Meds  Med Hx  Surg Hx  Fam Hx  Soc Hx      Reviewed and updated as needed this visit by Provider         ROS:  Constitutional, HEENT, cardiovascular, pulmonary, GI, , musculoskeletal, neuro, skin, endocrine and  "psych systems are negative, except as otherwise noted.    OBJECTIVE:     /70 (BP Location: Right arm, Cuff Size: Adult Regular)  Pulse 95  Temp 98.5  F (36.9  C) (Oral)  Ht 5' 3\" (1.6 m)  Wt 115 lb (52.2 kg)  LMP 11/07/2018 (Approximate)  SpO2 98%  BMI 20.37 kg/m2  Body mass index is 20.37 kg/(m^2).  GENERAL: healthy, alert and no distress  EYES: Eyes grossly normal to inspection, PERRL and conjunctivae and sclerae normal  NECK: no adenopathy, no asymmetry, masses, or scars and thyroid normal to palpation  RESP: lungs clear to auscultation - no rales, rhonchi or wheezes  CV: regular rate and rhythm, normal S1 S2, no S3 or S4, no murmur, click or rub, no peripheral edema and peripheral pulses strong  ABDOMEN: soft, nontender, no hepatosplenomegaly, no masses and bowel sounds normal  MS: no gross musculoskeletal defects noted, no edema  SKIN: no suspicious lesions or rashes  NEURO: Normal strength and tone, mentation intact and speech normal  PSYCH: mentation appears normal, affect normal/bright    Diagnostic Test Results:  Results for orders placed or performed in visit on 11/21/18   CBC with platelets   Result Value Ref Range    WBC 7.9 4.0 - 11.0 10e9/L    RBC Count 4.28 3.8 - 5.2 10e12/L    Hemoglobin 11.9 11.7 - 15.7 g/dL    Hematocrit 36.9 35.0 - 47.0 %    MCV 86 78 - 100 fl    MCH 27.8 26.5 - 33.0 pg    MCHC 32.2 31.5 - 36.5 g/dL    RDW 12.5 10.0 - 15.0 %    Platelet Count 290 150 - 450 10e9/L       ASSESSMENT/PLAN:       ICD-10-CM    1. Menorrhagia with irregular cycle N92.1 drospirenone-ethinyl estradiol (JORGE 28) 3-0.03 MG per tablet     TSH with free T4 reflex     CBC with platelets     Ferritin     Will trial rotation to different OCP, try one with higher endometrial phase, patient will recheck with E visit in the next couple of months if needed. Discussed risks and benefits of OCP medications. A professional  was used for this encounter.    Patient Instructions   1) Labs " downstairs today    2) Let me know if the Naomy is not working well for you- we have the option of an Evisit- where you can communicate online with us to troubleshoot what to do next. I typically recommend to try a medication for a couple of months to see if it adjusts well, but let me know if severe side effects before then.    MD Leo Calvin MD, MD  [facility]

## 2018-11-21 NOTE — MR AVS SNAPSHOT
After Visit Summary   11/21/2018    Milana Santos    MRN: [id number]           Patient Information     Date Of Birth          2000        Visit Information        Provider Department      11/21/2018 9:30 AM Leo Downs MD; [facility]        Today's Diagnoses     Excessive menstruation at puberty    -  1      Care Instructions    1) Labs downstairs today    2) Let me know if the Naomy is not working well for you- we have the option of an Evisit- where you can communicate online with us to troubleshoot what to do next. I typically recommend to try a medication for a couple of months to see if it adjusts well, but let me know if severe side effects before then.    Leo Downs MD          Follow-ups after your visit        Follow-up notes from your care team     Return in about 6 weeks (around 1/2/2019), or if symptoms worsen or fail to improve.      Who to contact     If you have questions or need follow up information about today's clinic visit or your schedule please contact [facility] directly at [phone].  Normal or non-critical lab and imaging results will be communicated to you by [employer], letter or phone within 4 business days after the clinic has received the results. If you do not hear from us within 7 days, please contact the clinic through [employer] or phone. If you have a critical or abnormal lab result, we will notify you by phone as soon as possible.  Submit refill requests through [employer] or call your pharmacy and they will forward the refill request to us. Please allow 3 business days for your refill to be completed.          Additional Information About Your Visit        MyChart Information     [employer] gives you secure access to your electronic health record. If you see a primary care provider, you can also send messages to your care team and make appointments. If you have questions, please call your primary care clinic.  If you do not  "have a primary care provider, please call [phone] and they will assist you.        Care EveryWhere ID     This is your Care EveryWhere ID. This could be used by other organizations to access your [facility] records  [id number]        Your Vitals Were     Pulse Temperature Height Last Period Pulse Oximetry BMI (Body Mass Index)    95 98.5  F (36.9  C) (Oral) 5' 3\" (1.6 m) 11/07/2018 (Approximate) 98% 20.37 kg/m2       Blood Pressure from Last 3 Encounters:   11/21/18 118/70   08/20/18 108/56   08/03/18 100/62    Weight from Last 3 Encounters:   11/21/18 115 lb (52.2 kg)   08/20/18 111 lb 11.2 oz (50.7 kg)   08/03/18 109 lb (49.4 kg) (13 %)*     * Growth percentiles are based on [facility] 2-20 Years data.              We Performed the Following     CBC with platelets     Ferritin     TSH with free T4 reflex          Today's Medication Changes          These changes are accurate as of 11/21/18 10:10 AM.  If you have any questions, ask your nurse or doctor.               Start taking these medicines.        Dose/Directions    drospirenone-ethinyl estradiol 3-0.03 MG per tablet   Commonly known as:  JORGE 28   Used for:  Excessive menstruation at puberty   Started by:  Leo Downs MD        Dose:  1 tablet   Take 1 tablet by mouth daily   Quantity:  84 tablet   Refills:  3            Where to get your medicines      These medications were sent to [facility] [place] - [address]   [address], [place] [address]     Phone:  [phone]     drospirenone-ethinyl estradiol 3-0.03 MG per tablet                Primary Care Provider Office Phone # Fax #    Tamara Vieira, ALLYN [phone]       [facility] [address] [place] [address]        Equal Access to Services     [facility]: Hadii aad ku hadasho Soomaali, waaxda luqadaha, qaybta kaalmada adeegyada, waxprieto christian ah. So wa " [phone].    ATENCIÓN: Si derrick lagunas, tiene a holder disposición servicios gratuitos de asistencia lingüística. Aicha camejo [phone].    We comply with applicable federal civil rights laws and [place] laws. We do not discriminate on the basis of race, color, national origin, age, disability, sex, sexual orientation, or gender identity.            Thank you!     Thank you for choosing [facility]  for your care. Our goal is always to provide you with excellent care. Hearing back from our patients is one way we can continue to improve our services. Please take a few minutes to complete the written survey that you may receive in the mail after your visit with us. Thank you!             Your Updated Medication List - Protect others around you: Learn how to safely use, store and throw away your medicines at www.disposemymeds.org.          This list is accurate as of 11/21/18 10:10 AM.  Always use your most recent med list.                   Brand Name Dispense Instructions for use Diagnosis    drospirenone-ethinyl estradiol 3-0.03 MG per tablet    JORGE 28    84 tablet    Take 1 tablet by mouth daily    Excessive menstruation at puberty       norgestim-eth estrad triphasic 0.18/0.215/0.25 MG-35 MCG per tablet    TRI-SPRINTEC    84 tablet    Take 1 tablet by mouth daily    Encounter for surveillance of contraceptive pills

## 2018-11-21 NOTE — PATIENT INSTRUCTIONS
1) Labs downstairs today    2) Let me know if the Naomy is not working well for you- we have the option of an Evisit- where you can communicate online with us to troubleshoot what to do next. I typically recommend to try a medication for a couple of months to see if it adjusts well, but let me know if severe side effects before then.    Leo Downs MD

## 2019-06-05 ENCOUNTER — OFFICE VISIT (OUTPATIENT)
Dept: URGENT CARE | Facility: URGENT CARE | Age: 19
End: 2019-06-05
Payer: MEDICAID

## 2019-06-05 VITALS
BODY MASS INDEX: 20.9 KG/M2 | TEMPERATURE: 98.3 F | OXYGEN SATURATION: 97 % | DIASTOLIC BLOOD PRESSURE: 66 MMHG | WEIGHT: 118 LBS | HEART RATE: 78 BPM | SYSTOLIC BLOOD PRESSURE: 114 MMHG

## 2019-06-05 DIAGNOSIS — R21 RASH AND NONSPECIFIC SKIN ERUPTION: Primary | ICD-10-CM

## 2019-06-05 PROCEDURE — 99213 OFFICE O/P EST LOW 20 MIN: CPT | Performed by: PHYSICIAN ASSISTANT

## 2019-06-05 RX ORDER — DIPHENHYDRAMINE HCL 50 MG
50 CAPSULE ORAL EVERY 6 HOURS PRN
Qty: 60 CAPSULE | Refills: 0 | Status: SHIPPED | OUTPATIENT
Start: 2019-06-05 | End: 2019-08-08

## 2019-06-05 RX ORDER — CETIRIZINE HYDROCHLORIDE 10 MG/1
10 TABLET ORAL DAILY
Qty: 30 TABLET | Refills: 0 | Status: SHIPPED | OUTPATIENT
Start: 2019-06-05 | End: 2019-09-14

## 2019-06-05 RX ORDER — CEPHALEXIN 500 MG/1
500 CAPSULE ORAL 3 TIMES DAILY
Qty: 30 CAPSULE | Refills: 0 | Status: SHIPPED | OUTPATIENT
Start: 2019-06-05 | End: 2019-09-14

## 2019-06-06 NOTE — PROGRESS NOTES
SUBJECTIVE:  Milana Santos is a 18 year old female who presents to the clinic today for a rash.  Onset of rash was 1 day(s) ago.   Rash is sudden onset.  Location of the rash: face.  Quality/symptoms of rash: itching and red   Symptoms are mild and rash seems to be stable.  Previous history of a similar rash? Yes: treated with antihistamines and keflex   Recent exposure history: none known    Associated symptoms include: nothing.    History reviewed. No pertinent past medical history.  Current Outpatient Medications   Medication Sig Dispense Refill     cephALEXin (KEFLEX) 500 MG capsule Take 1 capsule (500 mg) by mouth 3 times daily for 10 days 30 capsule 0     cetirizine (ZYRTEC) 10 MG tablet Take 1 tablet (10 mg) by mouth daily 30 tablet 0     diphenhydrAMINE (BENADRYL) 50 MG capsule Take 1 capsule (50 mg) by mouth every 6 hours as needed for itching or allergies 60 capsule 0     drospirenone-ethinyl estradiol (JORGE 28) 3-0.03 MG per tablet Take 1 tablet by mouth daily 84 tablet 3     norgestim-eth estrad triphasic (TRI-SPRINTEC) 0.18/0.215/0.25 MG-35 MCG per tablet Take 1 tablet by mouth daily (Patient not taking: Reported on 6/5/2019) 84 tablet 3     Social History     Tobacco Use     Smoking status: Never Smoker     Smokeless tobacco: Never Used   Substance Use Topics     Alcohol use: Not on file       ROS:  Review of systems negative except as stated above.    EXAM:   /66   Pulse 78   Temp 98.3  F (36.8  C) (Tympanic)   Wt 53.5 kg (118 lb)   SpO2 97%   BMI 20.90 kg/m    GENERAL: alert, no acute distress.  SKIN: Rash description:    Distribution: localized  Location: face    Color: red,  Lesion type: macular, blotchy with no other findings  GENERAL APPEARANCE: healthy, alert and no distress  EYES: conjunctiva clear  NECK: supple, non-tender to palpation, no adenopathy noted  RESP: lungs clear to auscultation - no rales, rhonchi or wheezes  CV: regular rates and rhythm, normal S1 S2, no murmur  noted    ASSESSMENT:  (R21) Rash and nonspecific skin eruption  (primary encounter diagnosis)  Comment: Treated successfully with keflex, will treat as requested  Plan: cephALEXin (KEFLEX) 500 MG capsule,         diphenhydrAMINE (BENADRYL) 50 MG capsule,         cetirizine (ZYRTEC) 10 MG tablet, DERMATOLOGY         REFERRAL       Follow up with PCP if symptoms worsen or fail to improve

## 2019-06-07 ENCOUNTER — OFFICE VISIT (OUTPATIENT)
Dept: URGENT CARE | Facility: URGENT CARE | Age: 19
End: 2019-06-07
Payer: MEDICAID

## 2019-06-07 VITALS
DIASTOLIC BLOOD PRESSURE: 66 MMHG | SYSTOLIC BLOOD PRESSURE: 106 MMHG | BODY MASS INDEX: 20.83 KG/M2 | WEIGHT: 117.6 LBS | HEART RATE: 77 BPM | TEMPERATURE: 98.7 F | OXYGEN SATURATION: 98 %

## 2019-06-07 DIAGNOSIS — R21 RASH: Primary | ICD-10-CM

## 2019-06-07 DIAGNOSIS — R21 RASH: ICD-10-CM

## 2019-06-07 PROCEDURE — 87070 CULTURE OTHR SPECIMN AEROBIC: CPT | Performed by: PHYSICIAN ASSISTANT

## 2019-06-07 PROCEDURE — 87529 HSV DNA AMP PROBE: CPT | Performed by: PHYSICIAN ASSISTANT

## 2019-06-07 PROCEDURE — 87147 CULTURE TYPE IMMUNOLOGIC: CPT | Performed by: PHYSICIAN ASSISTANT

## 2019-06-07 PROCEDURE — 99214 OFFICE O/P EST MOD 30 MIN: CPT | Performed by: PHYSICIAN ASSISTANT

## 2019-06-07 PROCEDURE — 87077 CULTURE AEROBIC IDENTIFY: CPT | Performed by: PHYSICIAN ASSISTANT

## 2019-06-07 PROCEDURE — 87186 SC STD MICRODIL/AGAR DIL: CPT | Performed by: PHYSICIAN ASSISTANT

## 2019-06-07 RX ORDER — TRIAMCINOLONE ACETONIDE 1 MG/G
CREAM TOPICAL 2 TIMES DAILY
Qty: 30 G | Refills: 0 | Status: SHIPPED | OUTPATIENT
Start: 2019-06-07 | End: 2019-08-08

## 2019-06-07 RX ORDER — CEPHALEXIN 500 MG/1
500 CAPSULE ORAL 3 TIMES DAILY
Qty: 21 CAPSULE | Refills: 0 | Status: SHIPPED | OUTPATIENT
Start: 2019-06-07 | End: 2019-09-14

## 2019-06-07 NOTE — PATIENT INSTRUCTIONS
IT doesn't look like herpes or a bacterial infection, but I will send them into the lab to be sure  We get the results in 3-4 days  ou will be picking up keflex (from W visit) and cream from today visit

## 2019-06-07 NOTE — PROGRESS NOTES
CHIEF COMPLAINT:   Chief Complaint   Patient presents with     Urgent Care     Personal     vaginal bumps x 1 week- she started with one now has five       HPI: Milana Santos is a 18 year old female who presents to clinic today for evaluation of rash. Patient has bilateral deafness and clinic visit and interview was done through writing. Patient reports that she has been experiencing vaginal bumps for the past week. Rash is itchy and has increased in size. She denies having any pain in the area. She has not had any thing like this in the past. She does not have a new sexual partner. She was recently seen on  6/5 and started taking Benadryl for a rash on her face. She denies having a history of eczema. She is currently on her LMP.     No past medical history on file.  No past surgical history on file.  Social History     Tobacco Use     Smoking status: Never Smoker     Smokeless tobacco: Never Used   Substance Use Topics     Alcohol use: Not on file     Current Outpatient Medications   Medication     cephALEXin (KEFLEX) 500 MG capsule     cetirizine (ZYRTEC) 10 MG tablet     diphenhydrAMINE (BENADRYL) 50 MG capsule     drospirenone-ethinyl estradiol (JORGE 28) 3-0.03 MG per tablet     triamcinolone (KENALOG) 0.1 % external cream     cephALEXin (KEFLEX) 500 MG capsule     norgestim-eth estrad triphasic (TRI-SPRINTEC) 0.18/0.215/0.25 MG-35 MCG per tablet     No current facility-administered medications for this visit.      No Known Allergies    10 point ROS of systems including Constitutional, Eyes, Respiratory, Cardiovascular, Gastroenterology, Genitourinary, Integumentary, Muscularskeletal, Psychiatric were all negative except for pertinent positives noted in my HPI.        Exam:  /66   Pulse 77   Temp 98.7  F (37.1  C) (Tympanic)   Wt 53.3 kg (117 lb 9.6 oz)   SpO2 98%   BMI 20.83 kg/m    Constitutional: healthy, alert and no distress  Head: Normocephalic, atraumatic.  Eyes: conjunctiva clear, no  drainage  ENT:MMM  Neck: neck is supple, no cervical lymphadenopathy or nuchal rigidity  Cardiovascular: RRR  Respiratory: CTA bilaterally, no rhonchi or rales  Skin: 1) skin of pubic area is dry, slightly raised, red patches with mild flaking. No vesicles. No draining. NO crusting.   2) face has several erythematous papules  Neurologic: Speech clear, gait normal. Moves all extremities.      ASSESSMENT/PLAN:  1. Rash  Unclear etiology of pubic rash. Differential dx includes, HSV, bacterial infection of skin, impetigo, folliculitis, hydradenitis and allergic reaction.  I encouraged her to use triamcinolone for itchiness, for no longer than 2 weeks at a time. We will contact if either skin swab comes back positive. Resent medication from visit on Tues, as it was not at the pharmacy.    - cephALEXin (KEFLEX) 500 MG capsule; Take 1 capsule (500 mg) by mouth 3 times daily for 7 days  Dispense: 21 capsule; Refill: 0  - triamcinolone (KENALOG) 0.1 % external cream; Apply topically 2 times daily Do not use for more than 2 weeks at a time  Dispense: 30 g; Refill: 0  - HSV 1 and 2 DNA by PCR  - Wound Culture Aerobic Bacterial; Future      I have discussed the patient's diagnosis and my plan of treatment with the patient. We went over any labs or imaging. Patient is aware to come back in with worsening symptoms or if no relief despite treatment plan.  Patient verbalizes understanding. All questions were addressed and answered.   Sanam Duff PA-C

## 2019-06-09 ENCOUNTER — NURSE TRIAGE (OUTPATIENT)
Dept: NURSING | Facility: CLINIC | Age: 19
End: 2019-06-09

## 2019-06-09 ENCOUNTER — TELEPHONE (OUTPATIENT)
Dept: URGENT CARE | Facility: URGENT CARE | Age: 19
End: 2019-06-09

## 2019-06-09 LAB
HSV1 DNA SPEC QL NAA+PROBE: NEGATIVE
HSV2 DNA SPEC QL NAA+PROBE: NEGATIVE
SPECIMEN SOURCE: NORMAL

## 2019-06-09 NOTE — TELEPHONE ENCOUNTER
"SUBJECTIVE:  The patient's June 7, 2019, Herpes Simplex tests did not detect any Herpes.  The preliminary result from the June 7, 2019, wound culture showed \"moderate growth\" of Staph aureus.      PLAN:  Please notify patient of these results.  Patient was started on Cephalexin by Dago Whyte on June 5, 2019.  Patient should continue the entire course of this medication, since the staph aureus should be killed by the Cephalexin.  Continue the Kenalog Cream as directed by Sanam Duff.      Bayron Gutiérrez MD    "

## 2019-06-09 NOTE — TELEPHONE ENCOUNTER
Due to patient's bilateral deafness, sent a [facility] info with the following info. I attempted to call her phone but got a strange message that didn't allow me to leave a VM.    All Gonzalez MA 5:17 PM 6/9/2019

## 2019-06-10 LAB
BACTERIA SPEC CULT: ABNORMAL
Lab: ABNORMAL
SPECIMEN SOURCE: ABNORMAL

## 2019-06-10 NOTE — TELEPHONE ENCOUNTER
"Milana returning a call and using a sign language interpretor. FNA advised the following note from patients chart:    \"Edison Cortés,     Hope all is well with you. Just wanted to pass along your lab results from your recent Urgent Care visit:     The patient's June 7, 2019, Herpes Simplex tests did not detect any Herpes.  The preliminary result from the June 7, 2019, wound culture showed \"moderate growth\" of Staph aureus.       PLAN:   Please notify patient of these results.  Patient was started on Cephalexin by Dago Whyte on June 5, 2019.  Patient should continue the entire course of this medication, since the staph aureus should be killed by the Cephalexin.  Continue the Kenalog Cream as directed by Sanam Duff.       Please let us know if you need anything.\"    Patient is wondering why she keeps getting staph infections. FNA advised to call her doctor if she has further questions.   Caller verbalized understanding and had no further questions.     Idania Xie RN/[facility]    Reason for Disposition    General information question, no triage required and triager able to answer question    Protocols used: INFORMATION ONLY CALL-A-AH      "

## 2019-08-07 NOTE — PROGRESS NOTES
SUBJECTIVE:   CC: Milana Santos is an 18 year old woman who presents for preventive health visit.     History of Present Illness        She eats 2-3 servings of fruits and vegetables daily.She consumes 0 sweetened beverage(s) daily.She is missing 1 dose(s) of medications per week.  She is not taking prescribed medications regularly due to remembering to take.  Healthy Habits:     Taking medications regularly:  1    Barriers to taking medications:  Remembering to take    PHQ-2 Total Score: 5    In person  used during the encounter.     #abdominal pain   Pain in the lower R back + RLQ/groin area happening every day, non stop on and off. R side abdomen as well. Denies any hematuria, dysuria, or increase in urinary frequency. She did notice she went more frequently to the bathroom yeseterday (8/7). Has not had UTIs in the past. Reports EGD 2018 was normal - completed in OSH. Weight has been relatively stable. No nausea or vomiting associated with this. No fevers.   Recently treated for yeast infection in [place], denying any new symptoms nor continuing discharge   Currently on OCPs and uses condoms. Just finished period    #anxiety  Currently living in [facility]. Having to return to college, new job, grandfather passed away so there have been a lot of increased stressors.Currenlty doing a lot of yoga and breathing exercises and anxiety comes and goes. Has been seeing counselor in [place] And it has been going okay. Will be going back to 1 week. Would like to hold off on starting any medications currently.   Denies any SI / HI.  Has tried lexapro before and it did not help.     Today's PHQ-2 Score:   PHQ-2 ( 1999 [employer]) 8/8/2019   Q1: Little interest or pleasure in doing things 3   Q2: Feeling down, depressed or hopeless 2   PHQ-2 Score 5   Q1: Little interest or pleasure in doing things Nearly every day   Q2: Feeling down, depressed or hopeless More than half the days   PHQ-2 Score 5     PHQ-9  SCORE 8/8/2019   PHQ-9 Total Score MyChart 13 (Moderate depression)   PHQ-9 Total Score 13     PELON-7 SCORE 7/18/2018 8/3/2018   Total Score 16 10     Abuse: Current or Past(Physical, Sexual or Emotional)- No  Do you feel safe in your environment? Yes    Social History     Tobacco Use     Smoking status: Never Smoker     Smokeless tobacco: Never Used   Substance Use Topics     Alcohol use: Yes     If you drink alcohol do you typically have >3 drinks per day or >7 drinks per week? No    Alcohol Use 8/8/2019   Prescreen: >3 drinks/day or >7 drinks/week? No       Reviewed orders with patient.  Reviewed health maintenance and updated orders accordingly - Yes    Pertinent mammograms are reviewed under the imaging tab.  History of abnormal Pap smear: NO - under age 21, PAP not appropriate for age     Reviewed and updated as needed this visit by clinical staff  Tobacco  Allergies  Med Hx  Surg Hx  Fam Hx  Soc Hx        Reviewed and updated as needed this visit by Provider        History reviewed. No pertinent past medical history.   History reviewed. No pertinent surgical history.    Review of Systems  CONSTITUTIONAL: NEGATIVE for fever, chills, change in weight  INTEGUMENTARU/SKIN: NEGATIVE for worrisome rashes, moles or lesions  EYES: NEGATIVE for vision changes or irritation  ENT: NEGATIVE for ear, mouth and throat problems  RESP: NEGATIVE for significant cough or SOB  BREAST: NEGATIVE for masses, tenderness or discharge  CV: NEGATIVE for chest pain, palpitations or peripheral edema  GI: NEGATIVE for nausea, + abdominal pain, heartburn, or change in bowel habits  : NEGATIVE for unusual urinary or vaginal symptoms. Periods are regular.  MUSCULOSKELETAL: NEGATIVE for significant arthralgias or myalgia+HA  NEURO: NEGATIVE for weakness, dizziness or paresthesias+HA  PSYCHIATRIC: NEGATIVE for changes in mood or affect    OBJECTIVE:   /60 (BP Location: Right arm, Patient Position: Chair, Cuff Size: Adult Regular)   " Pulse 99   Temp 98.2  F (36.8  C) (Oral)   Ht 1.607 m (5' 3.25\")   Wt 51.7 kg (113 lb 14.4 oz)   SpO2 99%   BMI 20.02 kg/m    Physical Exam  GENERAL: healthy, alert and no distress  EYES: Eyes grossly normal to inspection, PERRL and conjunctivae and sclerae normal  HENT: ear canals and TM's normal, nose and mouth without ulcers or lesions  NECK: no adenopathy, no asymmetry, masses, or scars and thyroid normal to palpation  RESP: lungs clear to auscultation - no rales, rhonchi or wheezes  CV: regular rate and rhythm, normal S1 S2, no S3 or S4, no murmur, click or rub, no peripheral edema and peripheral pulses strong  ABDOMEN: soft, tender to palpation in R lower groin area, and R lower abdomen. no hepatosplenomegaly, no masses and bowel sounds normal. No CVA tenderness.   MS: no gross musculoskeletal defects noted, no edema  SKIN: no suspicious lesions or rashes  NEURO: Normal strength and tone, mentation intact and speech normal  PSYCH: mentation appears normal, affect normal/bright    Diagnostic Test Results:  Labs reviewed in Epic      Wt Readings from Last 4 Encounters:   08/08/19 51.7 kg (113 lb 14.4 oz)   06/07/19 53.3 kg (117 lb 9.6 oz)   06/05/19 53.5 kg (118 lb)   11/21/18 52.2 kg (115 lb)       ASSESSMENT/PLAN:   19 yo female w/ pmhx of anxiety coming in for well visit; also complaining of RLQ that has been acute on chronic.     1. Routine general medical examination at a health care facility  2. Screen for STD (sexually transmitted disease)  Discussed screening for tests below as patient is sexually active and has not been screened. Immunizations otherwise UTD.   - HIV Antigen Antibody Combo  - NEISSERIA GONORRHOEA PCR  - CHLAMYDIA TRACHOMATIS PCR  - Treponema Abs w Reflex to RPR and Titer    3. RLQ abdominal pain  Has had same complaint previously ~ 1 yr ago. Work up at the time negative (celiac, CMP, inflammatory markers). Pt with also hx of EGD in 2018 w/normal findings (OSH, per patient report, " "could not visualize results). Discussed with patient, will r/o pregnancy (test negative)and repeat CMP & UA if any findings come back positive. Did not strongly endorse UTI symptoms, but will check UA. Will order pelvic U/S to r/o any ovarian cyst etc, although on hx elicitation pt did not endorse any correlation with pain that occurs with her cycles.   - **Comprehensive metabolic panel FUTURE anytime; Future  - HCG Qual, Urine ([id number])  - US Pelvic Limited; Future  - UA ordered - appears to have been cancelled + CMP not done by patient. Will inform patient to return for lab visit to complete work up.     4. Anxiety  Pt w/ hx of anxiety,PHQ 9:13 today in clinic. No active SI/HI. Lives in [place] And does see counselor there. She explained that she would not like to start medications today, would like to revisit starting medications when she returns in the winter. This seems reasonable and per our discussion, no red flags where I think it would be unsafe for her to be on medications. She will also continue yoga and breathing techniques that she has utilized in the past.     COUNSELING:  Reviewed preventive health counseling, as reflected in patient instructions       Regular exercise       Healthy diet/nutrition       Contraception       Safe sex practices/STD prevention       HIV screeninx in teen years, 1x in adult years, and at intervals if high risk    Estimated body mass index is 20.02 kg/m  as calculated from the following:    Height as of this encounter: 1.607 m (5' 3.25\").    Weight as of this encounter: 51.7 kg (113 lb 14.4 oz).         reports that she has never smoked. She has never used smokeless tobacco.      Counseling Resources:  ATP IV Guidelines  Pooled Cohorts Equation Calculator  Breast Cancer Risk Calculator  FRAX Risk Assessment  ICSI Preventive Guidelines  Dietary Guidelines for Americans, 2010  USDA's MyPlate  ASA Prophylaxis  Lung CA Screening    This patient was seen and discussed with " Doretha Suarez MD  [facility]  Answers for HPI/ROS submitted by the patient on 8/8/2019   Chronic problems general questions HPI Form  If you checked off any problems, how difficult have these problems made it for you to do your work, take care of things at home, or get along with other people?: Somewhat difficult  PHQ9 TOTAL SCORE: 13    I have seen this patient and examined him in the presence of Dr. Suarez.  I was present during the key components of the presenting complaints, physical exam, diagnosis, and plan, and fully concur with the plan as listed in the resident's note.    Carson Coyne MD  Internal Medicine and Pediatrics

## 2019-08-07 NOTE — PATIENT INSTRUCTIONS
Lab work downstairs  Urine sample today  Ultrasound       Preventive Health Recommendations  Female Ages 18 to 20     Yearly exam:     See your health care provider every year in order to  o Review health changes.   o Discuss preventive care.    o Review your medicines if your doctor has prescribed any.      You should be tested each year for STDs (sexually transmitted diseases).       After age 20, talk to your provider about how often you should have cholesterol testing.      If you are at risk for diabetes, you should have a diabetes test (fasting glucose).     Shots:     Get a flu shot each year.     Get a tetanus shot every 10 years.     Consider getting the shot (vaccine) that prevents cervical cancer (Gardasil).    Nutrition:     Eat at least 5 servings of fruits and vegetables each day.    Eat whole-grain bread, whole-wheat pasta and brown rice instead of white grains and rice.    Get adequate Calcium and Vitamin D.     Lifestyle    Exercise at least 150 minutes a week each week (30 minutes a day, 5 days a week). This will help you control your weight and prevent disease.    No smoking.     Wear sunscreen to prevent skin cancer.    See your dentist every six months for an exam and cleaning.

## 2019-08-08 ENCOUNTER — OFFICE VISIT (OUTPATIENT)
Dept: PEDIATRICS | Facility: CLINIC | Age: 19
End: 2019-08-08
Payer: MEDICAID

## 2019-08-08 VITALS
DIASTOLIC BLOOD PRESSURE: 60 MMHG | HEART RATE: 99 BPM | OXYGEN SATURATION: 99 % | SYSTOLIC BLOOD PRESSURE: 112 MMHG | HEIGHT: 63 IN | TEMPERATURE: 98.2 F | WEIGHT: 113.9 LBS | BODY MASS INDEX: 20.18 KG/M2

## 2019-08-08 DIAGNOSIS — Z11.3 SCREEN FOR STD (SEXUALLY TRANSMITTED DISEASE): ICD-10-CM

## 2019-08-08 DIAGNOSIS — Z00.00 ROUTINE GENERAL MEDICAL EXAMINATION AT A HEALTH CARE FACILITY: Primary | ICD-10-CM

## 2019-08-08 DIAGNOSIS — F41.9 ANXIETY: ICD-10-CM

## 2019-08-08 DIAGNOSIS — R10.31 RLQ ABDOMINAL PAIN: ICD-10-CM

## 2019-08-08 LAB — HCG UR QL: NEGATIVE

## 2019-08-08 PROCEDURE — 87491 CHLMYD TRACH DNA AMP PROBE: CPT | Performed by: INTERNAL MEDICINE

## 2019-08-08 PROCEDURE — 36415 COLL VENOUS BLD VENIPUNCTURE: CPT | Performed by: INTERNAL MEDICINE

## 2019-08-08 PROCEDURE — 87591 N.GONORRHOEAE DNA AMP PROB: CPT | Performed by: INTERNAL MEDICINE

## 2019-08-08 PROCEDURE — T1013 SIGN LANG/ORAL INTERPRETER: HCPCS | Mod: U3 | Performed by: INTERNAL MEDICINE

## 2019-08-08 PROCEDURE — 99213 OFFICE O/P EST LOW 20 MIN: CPT | Mod: 25 | Performed by: STUDENT IN AN ORGANIZED HEALTH CARE EDUCATION/TRAINING PROGRAM

## 2019-08-08 PROCEDURE — 99395 PREV VISIT EST AGE 18-39: CPT | Mod: GC | Performed by: STUDENT IN AN ORGANIZED HEALTH CARE EDUCATION/TRAINING PROGRAM

## 2019-08-08 PROCEDURE — 81025 URINE PREGNANCY TEST: CPT | Performed by: INTERNAL MEDICINE

## 2019-08-08 PROCEDURE — 87389 HIV-1 AG W/HIV-1&-2 AB AG IA: CPT | Performed by: INTERNAL MEDICINE

## 2019-08-08 PROCEDURE — 86780 TREPONEMA PALLIDUM: CPT | Performed by: INTERNAL MEDICINE

## 2019-08-08 ASSESSMENT — PATIENT HEALTH QUESTIONNAIRE - PHQ9
SUM OF ALL RESPONSES TO PHQ QUESTIONS 1-9: 13
10. IF YOU CHECKED OFF ANY PROBLEMS, HOW DIFFICULT HAVE THESE PROBLEMS MADE IT FOR YOU TO DO YOUR WORK, TAKE CARE OF THINGS AT HOME, OR GET ALONG WITH OTHER PEOPLE: SOMEWHAT DIFFICULT
SUM OF ALL RESPONSES TO PHQ QUESTIONS 1-9: 13

## 2019-08-08 ASSESSMENT — MIFFLIN-ST. JEOR: SCORE: 1259.74

## 2019-08-09 ENCOUNTER — HOSPITAL ENCOUNTER (OUTPATIENT)
Dept: ULTRASOUND IMAGING | Facility: CLINIC | Age: 19
Discharge: HOME | End: 2019-08-09
Attending: INTERNAL MEDICINE | Admitting: INTERNAL MEDICINE
Payer: MEDICAID

## 2019-08-09 DIAGNOSIS — R10.31 RLQ ABDOMINAL PAIN: ICD-10-CM

## 2019-08-09 LAB
C TRACH DNA SPEC QL NAA+PROBE: NEGATIVE
HIV 1+2 AB+HIV1 P24 AG SERPL QL IA: NONREACTIVE
N GONORRHOEA DNA SPEC QL NAA+PROBE: NEGATIVE
SPECIMEN SOURCE: NORMAL
SPECIMEN SOURCE: NORMAL
T PALLIDUM AB SER QL: NONREACTIVE

## 2019-08-09 PROCEDURE — 76830 TRANSVAGINAL US NON-OB: CPT

## 2019-08-21 ASSESSMENT — PATIENT HEALTH QUESTIONNAIRE - PHQ9: 5. POOR APPETITE OR OVEREATING: MORE THAN HALF THE DAYS

## 2019-08-21 ASSESSMENT — ANXIETY QUESTIONNAIRES
5. BEING SO RESTLESS THAT IT IS HARD TO SIT STILL: MORE THAN HALF THE DAYS
7. FEELING AFRAID AS IF SOMETHING AWFUL MIGHT HAPPEN: MORE THAN HALF THE DAYS
3. WORRYING TOO MUCH ABOUT DIFFERENT THINGS: NEARLY EVERY DAY
GAD7 TOTAL SCORE: 15
6. BECOMING EASILY ANNOYED OR IRRITABLE: SEVERAL DAYS
2. NOT BEING ABLE TO STOP OR CONTROL WORRYING: NEARLY EVERY DAY
1. FEELING NERVOUS, ANXIOUS, OR ON EDGE: MORE THAN HALF THE DAYS
IF YOU CHECKED OFF ANY PROBLEMS ON THIS QUESTIONNAIRE, HOW DIFFICULT HAVE THESE PROBLEMS MADE IT FOR YOU TO DO YOUR WORK, TAKE CARE OF THINGS AT HOME, OR GET ALONG WITH OTHER PEOPLE: SOMEWHAT DIFFICULT

## 2019-08-22 ASSESSMENT — ANXIETY QUESTIONNAIRES: GAD7 TOTAL SCORE: 15

## 2019-09-14 ENCOUNTER — OFFICE VISIT (OUTPATIENT)
Dept: URGENT CARE | Facility: URGENT CARE | Age: 19
End: 2019-09-14
Payer: MEDICAID

## 2019-09-14 VITALS
HEART RATE: 88 BPM | OXYGEN SATURATION: 98 % | DIASTOLIC BLOOD PRESSURE: 62 MMHG | SYSTOLIC BLOOD PRESSURE: 110 MMHG | BODY MASS INDEX: 19.7 KG/M2 | WEIGHT: 112.1 LBS | TEMPERATURE: 96.4 F

## 2019-09-14 DIAGNOSIS — B96.89 BV (BACTERIAL VAGINOSIS): Primary | ICD-10-CM

## 2019-09-14 DIAGNOSIS — N76.0 BV (BACTERIAL VAGINOSIS): Primary | ICD-10-CM

## 2019-09-14 DIAGNOSIS — R10.84 GENERALIZED ABDOMINAL PAIN: ICD-10-CM

## 2019-09-14 DIAGNOSIS — N89.8 VAGINAL DISCHARGE: ICD-10-CM

## 2019-09-14 LAB
ALBUMIN UR-MCNC: NEGATIVE MG/DL
APPEARANCE UR: CLEAR
BILIRUB UR QL STRIP: NEGATIVE
COLOR UR AUTO: YELLOW
GLUCOSE UR STRIP-MCNC: NEGATIVE MG/DL
HGB UR QL STRIP: NEGATIVE
KETONES UR STRIP-MCNC: NEGATIVE MG/DL
LEUKOCYTE ESTERASE UR QL STRIP: NEGATIVE
NITRATE UR QL: NEGATIVE
PH UR STRIP: 8.5 PH (ref 5–7)
SOURCE: ABNORMAL
SP GR UR STRIP: 1.01 (ref 1–1.03)
SPECIMEN SOURCE: ABNORMAL
UROBILINOGEN UR STRIP-ACNC: 0.2 EU/DL (ref 0.2–1)
WET PREP SPEC: ABNORMAL

## 2019-09-14 PROCEDURE — 81003 URINALYSIS AUTO W/O SCOPE: CPT | Performed by: PHYSICIAN ASSISTANT

## 2019-09-14 PROCEDURE — 87210 SMEAR WET MOUNT SALINE/INK: CPT | Performed by: PHYSICIAN ASSISTANT

## 2019-09-14 PROCEDURE — 99214 OFFICE O/P EST MOD 30 MIN: CPT | Performed by: PHYSICIAN ASSISTANT

## 2019-09-14 RX ORDER — METRONIDAZOLE 7.5 MG/G
1 GEL VAGINAL DAILY
Qty: 70 G | Refills: 0 | Status: SHIPPED | OUTPATIENT
Start: 2019-09-14 | End: 2019-09-19

## 2019-09-14 NOTE — PROGRESS NOTES
Milana Garcia  presents to clinic today for evaluation of possible vaginitis or bladder infection. Patient also c/o generalized abdominal discomfort across entire lower abdomen, waxing and waning for approximately one year.         ROS:     CONSTITUTIONAL: Denies and fever, chills, acute weakness or acute onset fatigue   GYN HX: Single. LMP: 8/24/19 at expected interval.  Denies any pelvic pain. Patient also states she has had generalized abdominal discomfort across entire lower abdomen for many months.She has seen PCP and had a normal pelvic US (8/9/19) and negative STI screening 8/8/19. She denies any sudden acute change in longstanding discomfort.     GI: Positive as per above.  Denies any F/C/N/V/D. Denies any black or bloody stools. Denies any unexplained weight loss.   CARDIAC: Denies any CP or SOB   RESP: Denies any CP or SOB  URINARY REVIEW:  C/o some atypical dysuria (on/off itching and burning with urination) Denies any urinary urgency/frequency, hematuria, fever, chills, nausea, vomiting, back or flank pain.  SKIN: Denies any rash or lesions   RHEUM: Denies any red, warm or swollen joints     OBJECTIVE:  /62   Pulse 88   Temp 96.4  F (35.8  C) (Tympanic)   Wt 50.8 kg (112 lb 1.6 oz)   LMP 08/24/2019   SpO2 98%   BMI 19.70 kg/m        GENERAL:  Very pleasant, comfortable and generally well appearing.  SKIN: No rashes.  Normal color.  Sclera clear.  CARDIAC:NORMAL - regular rate and rhythm without murmur., normal s1/s2 and without extra heart sounds  RESP: Normal - CTA without rales, rhonchi, or wheezing.  ABDOMEN:  Soft, non-tender, non-distended.  Positive normal bowel sounds.  No HSM or masses.  No suprapubic tenderness.  No CVA tenderness.  NEURO: Alert and oriented.  Normal speech and mentation.  CN II/XII grossly intact.  Gait within normal limits.    EXT: No LE edema     LAB:    Results for orders placed or performed in visit on 09/14/19   UA reflex to Microscopic   Result Value Ref  "Range    Color Urine Yellow     Appearance Urine Clear     Glucose Urine Negative NEG^Negative mg/dL    Bilirubin Urine Negative NEG^Negative    Ketones Urine Negative NEG^Negative mg/dL    Specific Gravity Urine 1.015 1.003 - 1.035    Blood Urine Negative NEG^Negative    pH Urine 8.5 (H) 5.0 - 7.0 pH    Protein Albumin Urine Negative NEG^Negative mg/dL    Urobilinogen Urine 0.2 0.2 - 1.0 EU/dL    Nitrite Urine Negative NEG^Negative    Leukocyte Esterase Urine Negative NEG^Negative    Source Midstream Urine    Wet prep   Result Value Ref Range    Specimen Description Vagina     Wet Prep Few  Clue cells seen   (A)     Wet Prep No yeast seen     Wet Prep No Trichomonas seen     Wet Prep Moderate  WBC'S seen          ASSESSMENT/PLAN:    (N76.0,  B96.89) BV (bacterial vaginosis)  (primary encounter diagnosis)  Plan: metroNIDAZOLE (METROGEL) 0.75 % vaginal gel          (R10.84) Generalized abdominal pain    MDM: One year hx of waxing waning abdominal pain of uncertain etiology. Recent HCG and STI screening test results normal. Pelvic US normal. Review of [facility] states PCP has advised Miralax and, if sxs persist, has advised she follow-up with PCP and possible CT abd. I reviewed this with patient today. No evidence of upper or lower urinary tract etiology today. Very reassuring exam. I have advised she have short interval follow-up with PCP here (or at school out of state) for further evaluation and treatment. Criteria for immediate re-evaluation are reviewed with patient verbally and provided in printed form today.     Plan: UA reflex to Microscopic        September 14, 2019 [facility] Plan:     1. I have prescribed a vaginal gel (Metrogel) to treat your nuisance BV infection.     Please see the handout I provided about the vaginal environment and about BV. To help restore the normal \"good bacteria\" vaginal bacteria balance, I recommend the below.     -Use a non-scented soap for bathing (like Dove Sensitive " "Skin)    -Do not use scented bath products     -Do not use scented shower gels     -Do not use over-the-counter feminine hygiene products     If your symptoms do not fully resolve with the BV treatment provided here or if you develop any fever, severe pelvic pain or any sudden, new symptoms you should be seen immediatly.     2.  I do not know what is causing your one year of waxing and waning abdominal discomfort. I advise you see a primary care provider either at school or here in [place] for further evaluation of your chronic symptoms.     If you develop any of the below, you should report directly to the emergency room:     Call 911 if any of these occur:    Trouble breathing    Confusion    Fainting or loss of consciousness    Rapid heart rate    Seizure  When to seek medical advice  Call your healthcare provider right away if any of these occur:    Pain gets worse or moves to the right lower abdomen    New or worsening vomiting or diarrhea    Swelling of the abdomen    Unable to pass stool for more than 3 days    Fever of 100.4 F (38 C) or higher, or as directed by your healthcare provider.    Blood in vomit or bowel movements (dark red or black color)    Yellow color of eyes and skin (jaundice)    Weakness, dizziness    Chest, arm, back, neck, or jaw pain    Unexpected vaginal bleeding or missed period    Can't keep down liquids or water and you are getting dehydrated     In addition to the above, BV and abdominal pain uncertain cause \"red flag\" signs and sxs are reviewed with pt both verbally and by way of printed educational material for home review.  Pt verbalizes understanding of and agrees to the above plan.       (N89.8) Vaginal discharge  Plan: Wet prep        As per above       "

## 2019-09-14 NOTE — PATIENT INSTRUCTIONS
Patient Education     Bacterial Vaginosis    You have a vaginal infection called bacterial vaginosis (BV). Both good and bad bacteria are present in a healthy vagina. BV occurs when these bacteria get out of balance. The number of bad bacteria increase. And the number of good bacteria decrease. Although BV is associated with sexual activity, it is not a sexually transmitted disease.  BV may or may not cause symptoms. If symptoms do occur, they can include:    Thin, gray, milky-white, or sometimes green discharge    Unpleasant odor or  fishy  smell    Itching, burning, or pain in or around the vagina  It is not known what causes BV, but certain factors can make the problem more likely. This can include:    Douching    Having sex with a new partner    Having sex with more than one partner  BV will sometimes go away on its own. But treatment is usually recommended. This is because untreated BV can increase the risk of more serious health problems such as:    Pelvic inflammatory disease (PID)     delivery (giving birth to a baby early if you re pregnant)    HIV and certain other sexually transmitted diseases (STDs)    Infection after surgery on the reproductive organs  Home care  General care    BV is most often treated with medicines called antibiotics. These may be given as pills or as a vaginal cream. If antibiotics are prescribed, be sure to use them exactly as directed. Also, be sure to complete all of the medicine, even if your symptoms go away.    Don't douche or having sex during treatment.    If you have sex with a female partner, ask your healthcare provider if she should also be treated.  Prevention    Don't douche.    Don't have sex. If you do have sex, then take steps to lower your risk:  ? Use condoms when having sex.  ? Limit the number of sexual partners you have.  Follow-up care  Follow up with your healthcare provider, or as advised.  When to seek medical advice  Call your healthcare provider  right away if:    You have a fever of 100.4 F (38 C) or higher, or as directed by your provider.    Your symptoms worsen, or they don t go away within a few days of starting treatment.    You have new pain in the lower belly or pelvic region.    You have side effects that bother you or a reaction to the pills or cream you re prescribed.    You or any partners you have sex with have new symptoms, such as a rash, joint pain, or sores.  Date Last Reviewed: 10/1/2017    2235-7409 [employer]. [address] [place] [address]. All rights reserved. This information is not intended as a substitute for professional medical care. Always follow your healthcare professional's instructions.           Patient Education     Vaginal Infection: Understanding the Vaginal Environment  The vagina is a canal. It connects the uterus (womb) to the outside of the body. It is home to many types of bacteria and other tiny organisms. These different bacteria most often stay balanced in number. This keeps the vagina healthy. If the balance changes, it can cause infection.   A healthy environment  Many types of bacteria are present in a healthy vagina. When balanced, they don t cause problems. Small amounts of yeast may also be present without causing problems. The most common type of bacteria in the vagina is lactobacillus. It helps keep the vagina at a low pH. A low pH keeps bad bacteria from taking over.  Normal vaginal discharge  The vagina makes fluid. It is sent out as discharge. This also keeps the vagina healthy. Normal discharge can be clear, white, or yellowish. Most women find that normal discharge varies in amount and color through the month.  An unhealthy environment  The vaginal environment may get out of balance. This may result in a vaginal infection. There are a few reasons this can happen. The pH may have changed. The amount of one organism, such as yeast, may increase. Or an outside organism may get  into the vagina and throw off the balance:    Bacterial vaginosis (BV). BV is due to an imbalance in the normal bacteria in the vagina. Lactobacillus bacteria decrease. As a result, the numbers of bad bacteria increase.    Candidiasis (yeast infection). Yeast is a type of fungus. A yeast infection occurs when yeast cells in the vagina increase. They then attack vaginal tissues. A type of yeast called Candida albicans is often involved.    Trichomoniasis ( trich ). Trich is a parasite. It is passed from one person to another during sex. Men with trich often don t have any symptoms. In women, it can take weeks or months before symptoms appear.  Date Last Reviewed: 3/1/2017    [id number] [employer]. [address] [place] [address], [place] [address]. All rights reserved. This information is not intended as a substitute for professional medical care. Always follow your healthcare professional's instructions.         Patient Education     Unknown Causes of Abdominal Pain (Female)    The exact cause of your belly (abdominal) pain is not clear. This does not mean that this is something to worry about. Everyone likes to know the exact cause of the problem. But sometimes with belly pain, there is no clear-cut cause, and this could be a good thing. The good news is that your symptoms can be treated, and you will feel better.   Your condition does not seem serious now. But sometimes the signs of a serious problem may take more time to appear. For this reason, it is important for you to watch for any new symptoms, problems, or worsening of your condition.  Over the next few days, the abdominal pain may come and go. Or it may be constant. Other common symptoms can include nausea and vomiting. Sometimes it can be difficult to tell if you feel nauseous. You may just feel bad and not connect that feeling to nausea. Constipation, diarrhea, and a fever may go along with the pain.  The pain may continue even if treated correctly  over the following days. Depending on how things go, sometimes the cause can become clear and may need more or different treatment. Additional evaluations, medicines, or tests may also be needed.  Home care  Your healthcare provider may prescribe medicine for pain, symptoms, or an infection.  Follow the healthcare provider's instructions for taking these medicines.  General care    Rest as much as you can until your next exam. No strenuous activities.    Try to find positions that ease discomfort. A small pillow placed on the abdomen may help relieve pain.    Something warm on your abdomen (such as a heating pad) may help, but be careful not to burn yourself.  Diet    Don t force yourself to eat, especially if having cramps, vomiting, or diarrhea.    Water is important so you don't get dehydrated. Soup may also be good. Sports drinks may also help, especially if they are not too acidic. Don't drink sugary drinks as this can make things worse. Take liquids in small amounts. Don t guzzle them.    Caffeine sometimes makes the pain and cramping worse.    Don t take dairy products if you have vomiting or diarrhea.    Don't eat large amounts at a time. Wait a few minutes between bites.    Eat a diet low in fiber (called a low-residue diet). Foods allowed include refined breads, white rice, fruit and vegetable juices without pulp, tender meats. These foods will pass more easily through the intestine.    Don t have whole-grain foods, whole fruits and vegetables, meats, seeds and nuts, fried or fatty foods, dairy, alcohol and spicy foods until your symptoms go away.  Follow-up care  Follow up with your healthcare provider, or as advised, if your pain does not begin to improve in the next 24 hours.  Call 911  Call 911 if any of these occur:    Trouble breathing    Confusion    Fainting or loss of consciousness    Rapid heart rate    Seizure  When to seek medical advice  Call your healthcare provider right away if any of these  "occur:    Pain gets worse or moves to the right lower abdomen    New or worsening vomiting or diarrhea    Swelling of the abdomen    Unable to pass stool for more than 3 days    Fever of 100.4 F (38 C) or higher, or as directed by your healthcare provider.    Blood in vomit or bowel movements (dark red or black color)    Yellow color of eyes and skin (jaundice)    Weakness, dizziness    Chest, arm, back, neck, or jaw pain    Unexpected vaginal bleeding or missed period    Can't keep down liquids or water and you are getting dehydrated  Date Last Reviewed: 6/1/2018 2000-2018 [employer]. [address] [place] [address]. All rights reserved. This information is not intended as a substitute for professional medical care. Always follow your healthcare professional's instructions.          September 14, 2019 [facility] Plan:     1. I have prescribed a vaginal gel (Metrogel) to treat your nuisance BV infection.     Please see the handout I provided about the vaginal environment and about BV. To help restore the normal \"good bacteria\" vaginal bacteria balance, I recommend the below.     -Use a non-scented soap for bathing (like Dove Sensitive Skin)    -Do not use scented bath products     -Do not use scented shower gels     -Do not use over-the-counter feminine hygiene products     If your symptoms do not fully resolve with the BV treatment provided here or if you develop any fever, severe pelvic pain or any sudden, new symptoms you should be seen immediatly.     2.  I do not know what is causing your one year of waxing and waning abdominal discomfort. I advise you see a primary care provider either at school or here in [place] for further evaluation of your chronic symptoms.     If you develop any of the below, you should report directly to the emergency room:     Call 911 if any of these occur:    Trouble breathing    Confusion    Fainting or loss of consciousness    Rapid heart " rate    Seizure  When to seek medical advice  Call your healthcare provider right away if any of these occur:    Pain gets worse or moves to the right lower abdomen    New or worsening vomiting or diarrhea    Swelling of the abdomen    Unable to pass stool for more than 3 days    Fever of 100.4 F (38 C) or higher, or as directed by your healthcare provider.    Blood in vomit or bowel movements (dark red or black color)    Yellow color of eyes and skin (jaundice)    Weakness, dizziness    Chest, arm, back, neck, or jaw pain    Unexpected vaginal bleeding or missed period    Can't keep down liquids or water and you are getting dehydrated

## 2019-10-30 DIAGNOSIS — N92.1 MENORRHAGIA WITH IRREGULAR CYCLE: ICD-10-CM

## 2019-10-30 NOTE — TELEPHONE ENCOUNTER
"Requested Prescriptions   Pending Prescriptions Disp Refills     drospirenone-ethinyl estradiol (JORGE) 3-0.03 MG tablet [Pharmacy Med Name: DROSPIRENONE-ETHINYL ES 3-0.03 TABS]    Last Written Prescription Date:  11/21/2018  Last Fill Quantity: 84,  # refills: 3   Last office visit: 8/8/2019 with prescribing provider:  Tamara Taylor     Future Office Visit:   Next 5 appointments (look out 90 days)    Dec 19, 2019  3:15 PM CST  Office Visit with Rocco Max MD, EA EXAM ROOM 33  [facility] [address] [place] [address]  [address]  [facility] [address] [phone]            84 tablet 3     Sig: TAKE ONE TABLET BY MOUTH ONCE DAILY       Contraceptives Protocol Passed - 10/30/2019  4:04 PM        Passed - Patient is not a current smoker if age is 35 or older        Passed - Recent (12 mo) or future (30 days) visit within the authorizing provider's specialty     Patient has had an office visit with the authorizing provider or a provider within the authorizing providers department within the previous 12 mos or has a future within next 30 days. See \"Patient Info\" tab in inbasket, or \"Choose Columns\" in Meds & Orders section of the refill encounter.              Passed - Medication is active on med list        Passed - No active pregnancy on record        Passed - No positive pregnancy test in past 12 months          "

## 2019-10-30 NOTE — TELEPHONE ENCOUNTER
"Requested Prescriptions   Pending Prescriptions Disp Refills     drospirenone-ethinyl estradiol (JORGE) 3-0.03 MG tablet [Pharmacy Med Name: DROSPIRENONE-ETHINYL ES 3-0.03 TABS]  Last Written Prescription Date:  11/21/2018  Last Fill Quantity: 84 tablet,  # refills: 3   Last Office Visit: 8/8/2019 Darlyn Suarez MD  Future Office Visit:    Next 5 appointments (look out 90 days)    Dec 19, 2019  3:15 PM CST  Office Visit with Rocco Max MD, EA EXAM ROOM 33  [facility] [address] [place] [address]  [address]  [facility] [id number] [phone]          84 tablet 3     Sig: TAKE ONE TABLET BY MOUTH ONCE DAILY       Contraceptives Protocol Passed - 10/30/2019  5:55 PM        Passed - Patient is not a current smoker if age is 35 or older        Passed - Recent (12 mo) or future (30 days) visit within the authorizing provider's specialty     Patient has had an office visit with the authorizing provider or a provider within the authorizing providers department within the previous 12 mos or has a future within next 30 days. See \"Patient Info\" tab in inbasket, or \"Choose Columns\" in Meds & Orders section of the refill encounter.              Passed - Medication is active on med list        Passed - No active pregnancy on record        Passed - No positive pregnancy test in past 12 months          "

## 2019-10-31 RX ORDER — DROSPIRENONE AND ETHINYL ESTRADIOL 0.03MG-3MG
KIT ORAL
Qty: 84 TABLET | Refills: 3 | Status: SHIPPED | OUTPATIENT
Start: 2019-10-31

## 2019-10-31 RX ORDER — DROSPIRENONE AND ETHINYL ESTRADIOL 0.03MG-3MG
KIT ORAL
Qty: 84 TABLET | Refills: 3 | OUTPATIENT
Start: 2019-10-31

## 2020-01-08 ENCOUNTER — TELEPHONE (OUTPATIENT)
Dept: PEDIATRICS | Facility: CLINIC | Age: 20
End: 2020-01-08

## 2020-01-08 NOTE — TELEPHONE ENCOUNTER
MA/OLIMPIA-  Please assist pt in scheduling an appt for tomorrow with SDP. Abdominal pain. Needs female .     Call from pt with . Lower right sided abdominal pain. Sometimes on the left, sometimes in the back. Not sure if she has a fever, does not think so. Woke up with sharp pain this morning. She can't come in today. Will schedule for tomorrow. She understands if she worsens she should be evaluated in ER or UC. Lynn Anderson RN on 1/8/2020 at 2:16 PM

## 2020-01-08 NOTE — TELEPHONE ENCOUNTER
Attempted to call patient and schedule her with a same day provider. Patients home and mobile phone numbers are not in service. Patient does not look at her  messages.         Eliza Avina [facility]

## 2020-01-09 ENCOUNTER — OFFICE VISIT (OUTPATIENT)
Dept: PEDIATRICS | Facility: CLINIC | Age: 20
End: 2020-01-09
Payer: MEDICAID

## 2020-01-09 VITALS
BODY MASS INDEX: 20.73 KG/M2 | OXYGEN SATURATION: 100 % | TEMPERATURE: 96.6 F | HEIGHT: 63 IN | SYSTOLIC BLOOD PRESSURE: 118 MMHG | WEIGHT: 117 LBS | DIASTOLIC BLOOD PRESSURE: 62 MMHG | RESPIRATION RATE: 16 BRPM | HEART RATE: 92 BPM

## 2020-01-09 DIAGNOSIS — G44.209 TENSION HEADACHE: ICD-10-CM

## 2020-01-09 DIAGNOSIS — Z11.3 SCREEN FOR STD (SEXUALLY TRANSMITTED DISEASE): ICD-10-CM

## 2020-01-09 DIAGNOSIS — Z83.49 FAMILY HISTORY OF HYPOTHYROIDISM: ICD-10-CM

## 2020-01-09 DIAGNOSIS — F32.0 MILD MAJOR DEPRESSION (H): ICD-10-CM

## 2020-01-09 DIAGNOSIS — R10.9 FUNCTIONAL ABDOMINAL PAIN SYNDROME: Primary | ICD-10-CM

## 2020-01-09 DIAGNOSIS — F41.9 ANXIETY: ICD-10-CM

## 2020-01-09 DIAGNOSIS — Z12.4 SCREENING FOR MALIGNANT NEOPLASM OF CERVIX: ICD-10-CM

## 2020-01-09 LAB
ALBUMIN SERPL-MCNC: 4.4 G/DL (ref 3.4–5)
ALP SERPL-CCNC: 73 U/L (ref 40–150)
ALT SERPL W P-5'-P-CCNC: 46 U/L (ref 0–50)
ANION GAP SERPL CALCULATED.3IONS-SCNC: 7 MMOL/L (ref 3–14)
AST SERPL W P-5'-P-CCNC: 26 U/L (ref 0–45)
BILIRUB SERPL-MCNC: 0.6 MG/DL (ref 0.2–1.3)
BUN SERPL-MCNC: 11 MG/DL (ref 7–30)
CALCIUM SERPL-MCNC: 9.3 MG/DL (ref 8.5–10.1)
CHLORIDE SERPL-SCNC: 104 MMOL/L (ref 94–109)
CO2 SERPL-SCNC: 25 MMOL/L (ref 20–32)
CREAT SERPL-MCNC: 0.76 MG/DL (ref 0.52–1.04)
ERYTHROCYTE [DISTWIDTH] IN BLOOD BY AUTOMATED COUNT: 12.6 % (ref 10–15)
GFR SERPL CREATININE-BSD FRML MDRD: >90 ML/MIN/{1.73_M2}
GLUCOSE SERPL-MCNC: 89 MG/DL (ref 70–99)
HCT VFR BLD AUTO: 40.1 % (ref 35–47)
HGB BLD-MCNC: 13.2 G/DL (ref 11.7–15.7)
MCH RBC QN AUTO: 27.7 PG (ref 26.5–33)
MCHC RBC AUTO-ENTMCNC: 32.9 G/DL (ref 31.5–36.5)
MCV RBC AUTO: 84 FL (ref 78–100)
PLATELET # BLD AUTO: 278 10E9/L (ref 150–450)
POTASSIUM SERPL-SCNC: 3.7 MMOL/L (ref 3.4–5.3)
PROT SERPL-MCNC: 8 G/DL (ref 6.8–8.8)
RBC # BLD AUTO: 4.77 10E12/L (ref 3.8–5.2)
SODIUM SERPL-SCNC: 136 MMOL/L (ref 133–144)
TSH SERPL DL<=0.005 MIU/L-ACNC: 2.15 MU/L (ref 0.4–4)
WBC # BLD AUTO: 4.7 10E9/L (ref 4–11)

## 2020-01-09 PROCEDURE — 80053 COMPREHEN METABOLIC PANEL: CPT | Performed by: NURSE PRACTITIONER

## 2020-01-09 PROCEDURE — T1013 SIGN LANG/ORAL INTERPRETER: HCPCS | Mod: U3 | Performed by: NURSE PRACTITIONER

## 2020-01-09 PROCEDURE — 99214 OFFICE O/P EST MOD 30 MIN: CPT | Performed by: NURSE PRACTITIONER

## 2020-01-09 PROCEDURE — 87389 HIV-1 AG W/HIV-1&-2 AB AG IA: CPT | Performed by: NURSE PRACTITIONER

## 2020-01-09 PROCEDURE — 85027 COMPLETE CBC AUTOMATED: CPT | Performed by: NURSE PRACTITIONER

## 2020-01-09 PROCEDURE — 84443 ASSAY THYROID STIM HORMONE: CPT | Performed by: NURSE PRACTITIONER

## 2020-01-09 PROCEDURE — 87491 CHLMYD TRACH DNA AMP PROBE: CPT | Performed by: NURSE PRACTITIONER

## 2020-01-09 PROCEDURE — 86780 TREPONEMA PALLIDUM: CPT | Performed by: NURSE PRACTITIONER

## 2020-01-09 PROCEDURE — 36415 COLL VENOUS BLD VENIPUNCTURE: CPT | Performed by: NURSE PRACTITIONER

## 2020-01-09 PROCEDURE — 87591 N.GONORRHOEAE DNA AMP PROB: CPT | Performed by: NURSE PRACTITIONER

## 2020-01-09 PROCEDURE — 96127 BRIEF EMOTIONAL/BEHAV ASSMT: CPT | Mod: 59 | Performed by: NURSE PRACTITIONER

## 2020-01-09 PROCEDURE — G0145 SCR C/V CYTO,THINLAYER,RESCR: HCPCS | Performed by: NURSE PRACTITIONER

## 2020-01-09 ASSESSMENT — ANXIETY QUESTIONNAIRES
GAD7 TOTAL SCORE: 15
6. BECOMING EASILY ANNOYED OR IRRITABLE: MORE THAN HALF THE DAYS
3. WORRYING TOO MUCH ABOUT DIFFERENT THINGS: MORE THAN HALF THE DAYS
IF YOU CHECKED OFF ANY PROBLEMS ON THIS QUESTIONNAIRE, HOW DIFFICULT HAVE THESE PROBLEMS MADE IT FOR YOU TO DO YOUR WORK, TAKE CARE OF THINGS AT HOME, OR GET ALONG WITH OTHER PEOPLE: SOMEWHAT DIFFICULT
1. FEELING NERVOUS, ANXIOUS, OR ON EDGE: NEARLY EVERY DAY
7. FEELING AFRAID AS IF SOMETHING AWFUL MIGHT HAPPEN: MORE THAN HALF THE DAYS
5. BEING SO RESTLESS THAT IT IS HARD TO SIT STILL: MORE THAN HALF THE DAYS
2. NOT BEING ABLE TO STOP OR CONTROL WORRYING: MORE THAN HALF THE DAYS

## 2020-01-09 ASSESSMENT — PATIENT HEALTH QUESTIONNAIRE - PHQ9
SUM OF ALL RESPONSES TO PHQ QUESTIONS 1-9: 16
SUM OF ALL RESPONSES TO PHQ QUESTIONS 1-9: 16
5. POOR APPETITE OR OVEREATING: MORE THAN HALF THE DAYS
10. IF YOU CHECKED OFF ANY PROBLEMS, HOW DIFFICULT HAVE THESE PROBLEMS MADE IT FOR YOU TO DO YOUR WORK, TAKE CARE OF THINGS AT HOME, OR GET ALONG WITH OTHER PEOPLE: SOMEWHAT DIFFICULT

## 2020-01-09 ASSESSMENT — MIFFLIN-ST. JEOR: SCORE: 1264.84

## 2020-01-09 NOTE — PROGRESS NOTES
"Subjective     Milana Santos is a 19 year old female who presents to clinic today for the following health issues:    History of Present Illness        Hypothyroidism:     Since last visit, patient describes the following symptoms::  Anxiety, Depression, Dry skin and Hair loss    Migraines:   Since the patient's last clinic visit, headaches are: worsened  The patient is getting headaches:  Everyday  She is able to do normal daily activities when she has a migraine.  The patient is taking the following rescue/relief medications:  Ibuprofen (Advil, Motrin)   Patient states \"I get only a small amount of relief\" from the rescue/relief medications.   The patient is taking the following medications to prevent migraines:  No medications to prevent migraines  In the past 4 weeks, the patient has gone to an Urgent Care or Emergency Room 0 times times due to headaches.    She eats 0-1 servings of fruits and vegetables daily.She consumes 1 sweetened beverage(s) daily.  She is taking medications regularly.     Patient wants thyroid check today  AND  Abdominal Pain      Duration: Over a year. Suggest to come back if not better. Home from college right now and wanted to get it check out.     Also would like her Thyroid levels checked.     Description (location/character/radiation):        Associated flank pain: None    Intensity:  mild, moderate    Accompanying signs and symptoms:        Fever/Chills: YES       Gas/Bloating: no        Nausea/vomitting: no        Diarrhea: no        Dysuria or Hematuria: no     History (previous similar pain/trauma/previous testing):     Precipitating or alleviating factors:       Pain worse with eating/BM/urination: Dull pain after eating.        Pain relieved by BM: no     Therapies tried and outcome: None    LMP:  not applicable      She complains of 2+ year history of daily intermittent abdominal pain  Most often occurs in RLQ, but also LLQ and also upper quadrants and sometimes she feels " it in her back  It is sometimes, but not always, accompanied with subjective fevers  She has never checked her temperature  Denies nausea, vomiting or change in bowels  Sometimes the pain is relieved with having a BM, but not always  Sometimes the pain is relieved with eating, but not always  She had a normal endoscopy. Has not had a colonoscopy  Negative testing for celiac  She always tried eliminating gluten completely without any improvement in symptoms  She was referred to GI in 2018 but never scheduled that appointment  She is in college in [place] [facility]  and is home visiting  She has a history of depression and anxiety which she states are well controlled though we reviewed PHQ9 which indicates depression is not well controlled  She has been prescribed Lexapro in the past which she did not like  She does not want to take a daily medication  She saw a therapist at her school's counseling center and states she has the coping tools that she needs    She is sexually active  Due for her pap   On COCs  Agreeable for GC screening    At the end of our visit she mentioned she has also been having near daily headaches since approximately November  Frontal headaches  Sometimes she wakes up with them and sometimes they develop later in the day  She has not tried taking anything for them  She has never had an eye exam  She drinks several cups of coffee daily  Occasional alcohol use  Denies drug use  Headaches are mild  Denies vision change, N/V, confusion, lethargy    She would like TSH checked. Mom has hypothyroidism    nswers for HPI/ROS submitted by the patient on 1/9/2020   If you checked off any problems, how difficult have these problems made it for you to do your work, take care of things at home, or get along with other people?: Somewhat difficult  PHQ9 TOTAL SCORE: 16    Patient Active Problem List   Diagnosis     GBS (group B streptococcus) infection     Bilateral deafness     Anxiety     History reviewed. No  "pertinent surgical history.    Social History     Tobacco Use     Smoking status: Never Smoker     Smokeless tobacco: Never Used   Substance Use Topics     Alcohol use: Yes     Family History   Problem Relation Age of Onset     Thyroid Disease Mother          Current Outpatient Medications   Medication Sig Dispense Refill     drospirenone-ethinyl estradiol (JORGE) 3-0.03 MG tablet TAKE ONE TABLET BY MOUTH ONCE DAILY 84 tablet 3     BP Readings from Last 3 Encounters:   01/09/20 118/62   09/14/19 110/62   08/08/19 112/60    Wt Readings from Last 3 Encounters:   01/09/20 53.1 kg (117 lb)   09/14/19 50.8 kg (112 lb 1.6 oz)   08/08/19 51.7 kg (113 lb 14.4 oz)                    Reviewed and updated as needed this visit by Provider         Review of Systems   ROS COMP: Constitutional, HEENT, cardiovascular, pulmonary, gi and gu systems are negative, except as otherwise noted.      Objective    /62 (BP Location: Right arm, Patient Position: Chair, Cuff Size: Adult Regular)   Pulse 92   Temp 96.6  F (35.9  C) (Tympanic)   Resp 16   Ht 1.6 m (5' 3\")   Wt 53.1 kg (117 lb)   SpO2 100%   BMI 20.73 kg/m    Body mass index is 20.73 kg/m .  Physical Exam   GENERAL: healthy, alert and no distress  HENT: ear canals and TM's normal, nose and mouth without ulcers or lesions  NECK: no adenopathy, no asymmetry, masses, or scars and thyroid normal to palpation  RESP: lungs clear to auscultation - no rales, rhonchi or wheezes  CV: regular rate and rhythm, normal S1 S2, no S3 or S4, no murmur, click or rub, no peripheral edema and peripheral pulses strong  ABDOMEN: soft, nontender, no hepatosplenomegaly, no masses and bowel sounds normal   (female): normal female external genitalia, normal urethral meatus, vaginal mucosa, normal cervix with large amount of menstrual blood   MS: no gross musculoskeletal defects noted, no edema  SKIN: no suspicious lesions or rashes  NEURO: Normal strength and tone, mentation intact and " speech normal  PSYCH: mentation appears normal, affect normal/bright    Diagnostic Test Results:  Labs reviewed in Epic        Assessment & Plan     1. Functional abdominal pain syndrome  - Consistent with IBS though I recommend GI consult as previously advised. Has never had imaging or colonoscopy. We did discuss diagnosis of IBS and treatment strategies such as daily metamucil, SNRI, mental health management. She declines a medication for IBS or depression or anxiety. [place]  - CBC with platelets  - Comprehensive metabolic panel (BMP + Alb, Alk Phos, ALT, AST, Total. Bili, TP)  - GASTROENTEROLOGY ADULT REF CONSULT ONLY    2. Family history of hypothyroidism  - TSH with free T4 reflex    3. Mild major depression (H)  - Recommend trial of daily medication. Advised there are several options to choose from, and having SE from Lexapro does not mean she would not tolerate other medications. Also recommend return to therapist at school counseling center and she declines    4. Tension headache  - She brought this up at the end of our visit. No red flag symptoms. Recommend stress reduction, caffeine reduction, and eye exam. Also discussed trial of over the counter supplements. If no improvement, should be seen at our clinic for follow up or in [place] Patient was previously advised in 2018 to establish with IM provider in [place]     5. Screen for STD (sexually transmitted disease)  - NEISSERIA GONORRHOEA PCR  - CHLAMYDIA TRACHOMATIS PCR  - HIV Antigen Antibody Combo  - Treponema Abs w Reflex to RPR and Titer    6. Screening for malignant neoplasm of cervix  - Pap imaged thin layer screen only - recommended age 21 - 24 years    7. Anxiety  - As above         See Patient Instructions    No follow-ups on file.    ANTWAN Shafer [facility] MARTINEZ

## 2020-01-09 NOTE — PATIENT INSTRUCTIONS
I recommend starting daily metamucil. Try this daily for 1 month to see if the abdominal pain improves    I recommend seeing a gastroenterologist. I referred you to SOULEYMANE BENTON I in the [facility] area. You can call [phone] to schedule    Schedule an eye exam to see if this is contributing to headaches    You can try daily riboflavin 400 mg, magnesium 400 mg or B12 1000 mcg to see if this helps with headaches  You do not need to try all 3. You could try 1 at a time    Cut down on caffeine    Your [employer] username is   ARPAN

## 2020-01-10 ASSESSMENT — ANXIETY QUESTIONNAIRES: GAD7 TOTAL SCORE: 15

## 2020-01-10 NOTE — RESULT ENCOUNTER NOTE
Your labs look good!  Blood counts, kidney, liver and thyroid function are normal.   Gonorrhea and chlamydia screening was negative.   Sherrie Tijerina, CNP

## 2020-01-14 LAB
COPATH REPORT: NORMAL
PAP: NORMAL

## 2020-02-16 ENCOUNTER — HEALTH MAINTENANCE LETTER (OUTPATIENT)
Age: 20
End: 2020-02-16

## 2020-11-29 ENCOUNTER — HEALTH MAINTENANCE LETTER (OUTPATIENT)
Age: 20
End: 2020-11-29

## 2021-03-10 ENCOUNTER — TELEPHONE (OUTPATIENT)
Dept: PEDIATRICS | Facility: CLINIC | Age: 21
End: 2021-03-10

## 2021-03-10 NOTE — TELEPHONE ENCOUNTER
Panel Management Review      Patient has the following on her problem list:     Depression / Dysthymia review    Measure:  Needs PHQ-9 score of 4 or less during index window.  Administer PHQ-9 and if score is 5 or more, send encounter to provider for next steps.    5 - 7 month window range:    PHQ-9 SCORE 8/21/2019 1/9/2020 1/9/2020   PHQ-9 Total Score MyChart - - 16 (Moderately severe depression)   PHQ-9 Total Score 11 16 17       If PHQ-9 recheck is 5 or more, route to provider for next steps.    Patient is due for:  PHQ9      Composite cancer screening  Chart review shows that this patient is due/due soon for the following None  Summary:    Patient is due/failing the following:   PHQ9    Action needed:   Patient needs to do PHQ9.    Type of outreach:    Sent News in [employer] message.    Questions for provider review:    None                                                                                                                                    Alexa Garcia MA     Chart routed to Care Team .

## 2021-04-21 ENCOUNTER — APPOINTMENT (OUTPATIENT)
Dept: ULTRASOUND IMAGING | Facility: CLINIC | Age: 21
End: 2021-04-21
Attending: EMERGENCY MEDICINE
Payer: COMMERCIAL

## 2021-04-21 ENCOUNTER — HOSPITAL ENCOUNTER (EMERGENCY)
Facility: CLINIC | Age: 21
Discharge: HOME | End: 2021-04-21
Attending: EMERGENCY MEDICINE | Admitting: EMERGENCY MEDICINE
Payer: COMMERCIAL

## 2021-04-21 ENCOUNTER — OFFICE VISIT (OUTPATIENT)
Dept: INTERPRETER SERVICES | Facility: CLINIC | Age: 21
End: 2021-04-21
Payer: COMMERCIAL

## 2021-04-21 VITALS
OXYGEN SATURATION: 99 % | HEART RATE: 106 BPM | TEMPERATURE: 97.6 F | RESPIRATION RATE: 16 BRPM | SYSTOLIC BLOOD PRESSURE: 136 MMHG | DIASTOLIC BLOOD PRESSURE: 92 MMHG

## 2021-04-21 DIAGNOSIS — R21 RASH: ICD-10-CM

## 2021-04-21 DIAGNOSIS — B27.90 INFECTIOUS MONONUCLEOSIS WITHOUT COMPLICATION, INFECTIOUS MONONUCLEOSIS DUE TO UNSPECIFIED ORGANISM: ICD-10-CM

## 2021-04-21 LAB
ALBUMIN SERPL-MCNC: 3.9 G/DL (ref 3.4–5)
ALP SERPL-CCNC: 84 U/L (ref 40–150)
ALT SERPL W P-5'-P-CCNC: 44 U/L (ref 0–50)
ANION GAP SERPL CALCULATED.3IONS-SCNC: 6 MMOL/L (ref 3–14)
AST SERPL W P-5'-P-CCNC: 35 U/L (ref 0–45)
BASOPHILS # BLD AUTO: 0 10E9/L (ref 0–0.2)
BASOPHILS NFR BLD AUTO: 0 %
BILIRUB SERPL-MCNC: 0.4 MG/DL (ref 0.2–1.3)
BUN SERPL-MCNC: 8 MG/DL (ref 7–30)
CALCIUM SERPL-MCNC: 8.6 MG/DL (ref 8.5–10.1)
CHLORIDE SERPL-SCNC: 104 MMOL/L (ref 94–109)
CO2 SERPL-SCNC: 27 MMOL/L (ref 20–32)
CREAT SERPL-MCNC: 0.69 MG/DL (ref 0.52–1.04)
DIFFERENTIAL METHOD BLD: NORMAL
EOSINOPHIL # BLD AUTO: 0 10E9/L (ref 0–0.7)
EOSINOPHIL NFR BLD AUTO: 0 %
ERYTHROCYTE [DISTWIDTH] IN BLOOD BY AUTOMATED COUNT: 11.6 % (ref 10–15)
FLUAV RNA RESP QL NAA+PROBE: NEGATIVE
FLUBV RNA RESP QL NAA+PROBE: NEGATIVE
GFR SERPL CREATININE-BSD FRML MDRD: >90 ML/MIN/{1.73_M2}
GLUCOSE SERPL-MCNC: 93 MG/DL (ref 70–99)
HCT VFR BLD AUTO: 41.2 % (ref 35–47)
HGB BLD-MCNC: 13.9 G/DL (ref 11.7–15.7)
LABORATORY COMMENT REPORT: NORMAL
LYMPHOCYTES # BLD AUTO: 3.8 10E9/L (ref 0.8–5.3)
LYMPHOCYTES NFR BLD AUTO: 38 %
MCH RBC QN AUTO: 29 PG (ref 26.5–33)
MCHC RBC AUTO-ENTMCNC: 33.7 G/DL (ref 31.5–36.5)
MCV RBC AUTO: 86 FL (ref 78–100)
MONOCYTES # BLD AUTO: 0.5 10E9/L (ref 0–1.3)
MONOCYTES NFR BLD AUTO: 5 %
NEUTROPHILS # BLD AUTO: 5.7 10E9/L (ref 1.6–8.3)
NEUTROPHILS NFR BLD AUTO: 57 %
PLATELET # BLD AUTO: 235 10E9/L (ref 150–450)
PLATELET # BLD EST: NORMAL 10*3/UL
POTASSIUM SERPL-SCNC: 3.7 MMOL/L (ref 3.4–5.3)
PROT SERPL-MCNC: 7.6 G/DL (ref 6.8–8.8)
RBC # BLD AUTO: 4.79 10E12/L (ref 3.8–5.2)
RBC MORPH BLD: NORMAL
RSV RNA SPEC QL NAA+PROBE: NORMAL
SARS-COV-2 RNA RESP QL NAA+PROBE: NEGATIVE
SODIUM SERPL-SCNC: 137 MMOL/L (ref 133–144)
SPECIMEN SOURCE: NORMAL
VARIANT LYMPHS BLD QL SMEAR: PRESENT
WBC # BLD AUTO: 10 10E9/L (ref 4–11)

## 2021-04-21 PROCEDURE — 99284 EMERGENCY DEPT VISIT MOD MDM: CPT | Mod: 25

## 2021-04-21 PROCEDURE — 87636 SARSCOV2 & INF A&B AMP PRB: CPT | Performed by: EMERGENCY MEDICINE

## 2021-04-21 PROCEDURE — T1013 SIGN LANG/ORAL INTERPRETER: HCPCS | Mod: U3

## 2021-04-21 PROCEDURE — C9803 HOPD COVID-19 SPEC COLLECT: HCPCS

## 2021-04-21 PROCEDURE — 93970 EXTREMITY STUDY: CPT

## 2021-04-21 PROCEDURE — 80053 COMPREHEN METABOLIC PANEL: CPT | Performed by: EMERGENCY MEDICINE

## 2021-04-21 PROCEDURE — 85025 COMPLETE CBC W/AUTO DIFF WBC: CPT | Performed by: EMERGENCY MEDICINE

## 2021-04-21 ASSESSMENT — ENCOUNTER SYMPTOMS
SORE THROAT: 1
COUGH: 0
CHILLS: 1
SHORTNESS OF BREATH: 0
VOMITING: 0
DIARRHEA: 0

## 2021-04-21 NOTE — ED PROVIDER NOTES
History   Chief Complaint:  Rash       The history is provided by the patient and a parent. A  was used (American sign language).      Milana Santos is a 20 year old female who presents with a rash. The patient reports that she noticed a rash on her face two nights ago, which subsequently spread to her legs, feet, and hands.  The rash initially seemed to originate around her bilateral wrists.  Pain and swelling are most severe in her feet. It is very painful to walk. The patient does not know what prompted the onset of symptoms. She has not made any major changes to her daily regimen. New hygiene products or changes in diet are denied.  She was seen yesterday by primary care physician at her [facility] clinic. She was given referral to a dermatologist (appointment scheduled a week from tomorrow) and prescribed benadryl. Benadryl has been taken since this encounter, but has not relieved the rash. She has felt awful for the last couple of days, endorsing chills and dryness in her throat. Itching from the rash is benign. Her hands and feet have been abnormally warm. The patient denies cough, shortness of breath, sick contacts, vomiting, diarrhea, new medications, chronic medical problems, know allergies, or recent travel.     Of note, the patient has had the Covid-19 vaccine administered. Her second dose was received on 04/06/2021    Review of Systems   Constitutional: Positive for chills. Negative for fever.   HENT: Positive for sore throat (dryness).    Respiratory: Negative for cough and shortness of breath.    Cardiovascular: Positive for leg swelling.   Gastrointestinal: Negative for diarrhea and vomiting.   Skin: Positive for rash (Located on the face, hands, feet, and legs).   All other systems reviewed and are negative.      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  HEENT: Atraumatic.  PERRL.  EOMI. sclera normal bilaterally.  No intraoral lesions.  Oropharynx is without erythema, exudate, or swelling.  Moist mucous membranes.  Neck: Soft.  Supple.  No masses or swelling.  Cardiac: Regular rate and rhythm.  No murmur or rub.  Respiratory: Clear to auscultation bilaterally.  No respiratory distress.    Abdomen: Soft and nontender.   Nondistended.  Musculoskeletal: Edema of the bilateral hands and feet.  Distal pulses are intact and symmetric.  No erythema or warmth of the joints.  Rash noted as below.  Normal range of motion.  Neurologic: Alert and oriented x3.  Normal tone and bulk.   Normal gait.  Skin: There is a diffuse macular papular erythematous rash involving bilateral wrists, hands, and lower extremities.  No rashes of the trunk.  The area is blanching.  There are no vesicular lesions or skin peeling.  No petechiae or purpura.  Rashes present on the anterior distal forearms and posterior dorsal hands.  No involvement of the palms or soles.  Rash is also present from approximately mid thigh down into the dorsal feet.  There is edema of the bilateral feet and hands.  Psych: Normal affect.  Normal behavior.      Emergency Department Course   Imaging:  US Lower Extremity Venous Duplex Bilateral    1.  No deep venous thrombosis in the bilateral lower extremities.    Read per radiology    Laboratory:  CBC: WBC 10.0, HGB 13.9,   CMP: AWNL (Creatinine 0.69)    Symptomatic Influenza A/B & SARS-CoV2 (COVID19)  Virus PCR Multiplex: Negative    Emergency Department Course:  Reviewed:  I reviewed nursing notes, vitals, past medical history and care everywhere    Assessments:  1535 I obtained history and examined the patient as noted above.   1825 I rechecked the patient and explained findings. All questions are answered and the patient is  ready for discharge.     Disposition:  The patient was discharged to home.     Impression & Plan   Medical Decision Making:  Milana Santos is a 20-year-old woman who is afebrile and hemodynamically stable.  She has some nonspecific macular papular erythematous rash mostly on her extremities. She is otherwise well-appearing with no fever.  She is having pain and swelling of the lower extremities and ultrasound of the legs were obtained and revealed no evidence of DVTs.  Basic blood testing is unremarkable.  In reviewing care everywhere, it actually appears that her EBV and CMV heterophile testing came back yesterday suggesting mononucleosis.  I see telephone encounter the patient actually received a telephone call prior to coming in which she did not tell us about.  I do think this likely explains her symptoms both her fatigue and nonspecific rash.  I see no evidence of any other malignant type rash such as Allison-Ashok's or TENS.  There is no blistering or vesicular lesions.  She has no fever.  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  She is in agreement and understanding.  Questions were answered.  Discussed supportive care of her edema including elevation and compression stockings and she is understanding.  She was in no distress at time of discharge.    Covid-19  Milana Santos was evaluated during a global COVID-19  pandemic, which necessitated consideration that the patient might be at risk for infection with the SARS-CoV-2 virus that causes COVID-19.   Applicable protocols for evaluation were followed during the patient's care.   COVID-19 was considered as part of the patient's evaluation. The plan for testing is:  a test was obtained during this visit.    Diagnosis:    ICD-10-CM    1. Rash  R21    2. Infectious mononucleosis without complication, infectious mononucleosis due to unspecified organism  B27.90        Discharge Medications:  New Prescriptions    No medications on file       Scribe Disclosure:  Juanjo BURDICK, am serving as a scribe at 3:33 PM on 4/21/2021 to document services personally performed by Khanh Smiley MD based on my observations and the provider's statements to me.              Khanh Smiley MD  04/22/21 1500

## 2021-04-21 NOTE — ED TRIAGE NOTES
A&O x4.  ABC's intact.      Pt arrives with c/o rash x 2 days throat is sore & breathing so so. Took benadryl yesterday.

## 2021-04-21 NOTE — LETTER
April 21, 2021      To Whom It May Concern:      Milana Santos was seen in our Emergency Department today, 04/21/21.  I expect her condition to improve over the next 5 days.  She may return to work when cleared by primary care doctor and work protocols.    Sincerely,        Khanh Smiley MD

## 2021-04-22 ASSESSMENT — ENCOUNTER SYMPTOMS: FEVER: 0

## 2021-05-07 NOTE — TELEPHONE ENCOUNTER
Called patient - no answer. Lvm to asking to go in MyChart to please complete questionnaire sent on MyChart. Will postpone for follow up.     Alexa Garcia MA

## 2021-06-21 NOTE — TELEPHONE ENCOUNTER
Called patient - no answer. Lvm to please complete questionnaire that was sent to their MyChart. Multiple reach outs. Closing encounter.     Alexa Garcia MA

## 2021-09-19 ENCOUNTER — HEALTH MAINTENANCE LETTER (OUTPATIENT)
Age: 21
End: 2021-09-19

## 2022-01-09 ENCOUNTER — HEALTH MAINTENANCE LETTER (OUTPATIENT)
Age: 22
End: 2022-01-09

## 2022-11-21 ENCOUNTER — HEALTH MAINTENANCE LETTER (OUTPATIENT)
Age: 22
End: 2022-11-21

## 2023-04-16 ENCOUNTER — HEALTH MAINTENANCE LETTER (OUTPATIENT)
Age: 23
End: 2023-04-16

## 2024-10-29 ASSESSMENT — PATIENT HEALTH QUESTIONNAIRE - PHQ9: SUM OF ALL RESPONSES TO PHQ QUESTIONS 1-9: 13

## 2024-10-31 ASSESSMENT — PATIENT HEALTH QUESTIONNAIRE - PHQ9: SUM OF ALL RESPONSES TO PHQ QUESTIONS 1-9: 16
